# Patient Record
Sex: FEMALE | Race: WHITE | Employment: FULL TIME | URBAN - NONMETROPOLITAN AREA
[De-identification: names, ages, dates, MRNs, and addresses within clinical notes are randomized per-mention and may not be internally consistent; named-entity substitution may affect disease eponyms.]

---

## 2020-05-03 ENCOUNTER — HOSPITAL ENCOUNTER (EMERGENCY)
Age: 40
Discharge: HOME | End: 2020-05-03
Attending: EMERGENCY MEDICINE
Payer: COMMERCIAL

## 2020-05-03 VITALS
DIASTOLIC BLOOD PRESSURE: 88 MMHG | HEART RATE: 89 BPM | OXYGEN SATURATION: 98 % | RESPIRATION RATE: 18 BRPM | SYSTOLIC BLOOD PRESSURE: 138 MMHG | TEMPERATURE: 97.2 F

## 2020-05-03 LAB
GROUP A STREP CULTURE, REFLEX: NEGATIVE
REFLEX THROAT C + S: NORMAL

## 2020-05-03 PROCEDURE — 99282 EMERGENCY DEPT VISIT SF MDM: CPT

## 2020-05-03 PROCEDURE — 87070 CULTURE OTHR SPECIMN AEROBIC: CPT

## 2020-05-03 PROCEDURE — 87880 STREP A ASSAY W/OPTIC: CPT

## 2020-05-03 RX ORDER — FERROUS SULFATE 325(65) MG
325 TABLET ORAL
COMMUNITY
Start: 2018-08-13

## 2020-05-03 RX ORDER — AZITHROMYCIN 250 MG/1
TABLET, FILM COATED ORAL
Qty: 1 PACKET | Refills: 0 | Status: SHIPPED | OUTPATIENT
Start: 2020-05-03 | End: 2020-05-07

## 2020-05-03 ASSESSMENT — ENCOUNTER SYMPTOMS
ABDOMINAL PAIN: 0
SORE THROAT: 1
COUGH: 0
SHORTNESS OF BREATH: 0
WHEEZING: 0
BACK PAIN: 0
NAUSEA: 0

## 2020-05-03 ASSESSMENT — PAIN DESCRIPTION - LOCATION: LOCATION: THROAT

## 2020-05-03 NOTE — ED NOTES
Discharge instructions reviewed with patient and questions answered. Skin warm and dry, color normal for ethnicity. Remains alert and cooperative. Denies further questions or concerns at this time.      Rose Ruelas RN  05/03/20 5298

## 2020-05-03 NOTE — ED PROVIDER NOTES
[facility]  eMERGENCY dEPARTMENT eNCOUnter             [address]    CHIEF COMPLAINT    Chief Complaint   Patient presents with    Pharyngitis       Nurses Notes reviewed and I agree except as noted in the HPI. HPI    Alexandro Lane is a 44 y.o. female who presents with a 3 day history of worsening sore  Throat and frontal headache, mild rash on her cheeks which itches a little. No fever, nausea, abdominal pain. She has had Strep in the past. 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 Current pain is 3/10, burning, soreness in the throat. No treatment tried. REVIEW OF SYSTEMS      Review of Systems   Constitutional: Positive for malaise/fatigue. Negative for diaphoresis and fever. HENT: Positive for sore throat. Negative for congestion and ear pain. Respiratory: Negative for cough, shortness of breath and wheezing. Cardiovascular: Negative for chest pain and palpitations. Gastrointestinal: Negative for abdominal pain and nausea. Musculoskeletal: Negative for back pain, myalgias and neck pain. Skin: Positive for itching (cheeks) and rash. Neurological: Positive for headaches. Negative for dizziness and focal weakness. All other systems reviewed and are negative. PAST MEDICAL HISTORY     has no past medical history on file. SURGICAL HISTORY     has no past surgical history on file. CURRENT MEDICATIONS    Discharge Medication List as of 5/3/2020  3:58 PM      CONTINUE these medications which have NOT CHANGED    Details   ferrous sulfate (IRON 325) 325 (65 Fe) MG tablet Take 325 mg by mouth 2 times daily (with meals)Historical Med             ALLERGIES    is allergic to bupropion. FAMILY HISTORY    has no family status information on file. family history is not on file. SOCIAL HISTORY     reports that she has never smoked.  She has never used smokeless DISPOSITION/PLAN    DISPOSITION Decision To Discharge 05/03/2020 03:56:57 PM      PATIENT REFERRED TO:    Trey Wei  [address] [place] [address]  [phone]      As needed      DISCHARGE MEDICATIONS:    Discharge Medication List as of 5/3/2020  3:58 PM      START taking these medications    Details   azithromycin (ZITHROMAX Z-JH) 250 MG tablet Take 2 tablets (500 mg) on Day 1, and then take 1 tablet (250 mg) on days 2 through 5., Disp-1 packet, R-0Print                (Please note that portions of this note were completed with a voice recognition program.  Efforts were made to edit the dictations but occasionally words are mis-transcribed.)      Kaleb Soto MD  05/03/20 [id number]

## 2020-05-04 ENCOUNTER — CARE COORDINATION (OUTPATIENT)
Dept: CARE COORDINATION | Age: 40
End: 2020-05-04

## 2020-05-05 LAB — THROAT/NOSE CULTURE: NORMAL

## 2020-09-03 ENCOUNTER — HOSPITAL ENCOUNTER (EMERGENCY)
Age: 40
Discharge: HOME | End: 2020-09-03
Payer: COMMERCIAL

## 2020-09-03 VITALS
TEMPERATURE: 97.2 F | HEIGHT: 59 IN | WEIGHT: 143 LBS | HEART RATE: 107 BPM | DIASTOLIC BLOOD PRESSURE: 91 MMHG | SYSTOLIC BLOOD PRESSURE: 147 MMHG | RESPIRATION RATE: 16 BRPM | OXYGEN SATURATION: 100 % | BODY MASS INDEX: 28.83 KG/M2

## 2020-09-03 LAB
GROUP A STREP CULTURE, REFLEX: NEGATIVE
REFLEX THROAT C + S: NORMAL

## 2020-09-03 PROCEDURE — U0003 INFECTIOUS AGENT DETECTION BY NUCLEIC ACID (DNA OR RNA); SEVERE ACUTE RESPIRATORY SYNDROME CORONAVIRUS 2 (SARS-COV-2) (CORONAVIRUS DISEASE [COVID-19]), AMPLIFIED PROBE TECHNIQUE, MAKING USE OF HIGH THROUGHPUT TECHNOLOGIES AS DESCRIBED BY CMS-2020-01-R: HCPCS

## 2020-09-03 PROCEDURE — 87880 STREP A ASSAY W/OPTIC: CPT

## 2020-09-03 PROCEDURE — 87070 CULTURE OTHR SPECIMN AEROBIC: CPT

## 2020-09-03 PROCEDURE — 99203 OFFICE O/P NEW LOW 30 MIN: CPT | Performed by: NURSE PRACTITIONER

## 2020-09-03 PROCEDURE — 99214 OFFICE O/P EST MOD 30 MIN: CPT

## 2020-09-03 RX ORDER — CEFDINIR 300 MG/1
300 CAPSULE ORAL 2 TIMES DAILY
Qty: 14 CAPSULE | Refills: 0 | Status: SHIPPED | OUTPATIENT
Start: 2020-09-03 | End: 2020-09-10

## 2020-09-03 SDOH — HEALTH STABILITY: MENTAL HEALTH: HOW OFTEN DO YOU HAVE A DRINK CONTAINING ALCOHOL?: NEVER

## 2020-09-03 ASSESSMENT — ENCOUNTER SYMPTOMS
VOMITING: 0
SHORTNESS OF BREATH: 0
NAUSEA: 0
SINUS PRESSURE: 1
SORE THROAT: 1
COUGH: 0

## 2020-09-03 ASSESSMENT — PAIN DESCRIPTION - PROGRESSION: CLINICAL_PROGRESSION: NOT CHANGED

## 2020-09-03 ASSESSMENT — PAIN DESCRIPTION - PAIN TYPE: TYPE: ACUTE PAIN

## 2020-09-03 ASSESSMENT — PAIN - FUNCTIONAL ASSESSMENT: PAIN_FUNCTIONAL_ASSESSMENT: ACTIVITIES ARE NOT PREVENTED

## 2020-09-03 ASSESSMENT — PAIN DESCRIPTION - FREQUENCY: FREQUENCY: CONTINUOUS

## 2020-09-03 ASSESSMENT — PAIN DESCRIPTION - LOCATION: LOCATION: THROAT

## 2020-09-03 ASSESSMENT — PAIN DESCRIPTION - ONSET: ONSET: GRADUAL

## 2020-09-03 ASSESSMENT — PAIN SCALES - GENERAL: PAINLEVEL_OUTOF10: 3

## 2020-09-03 ASSESSMENT — PAIN DESCRIPTION - DESCRIPTORS: DESCRIPTORS: DULL;ACHING

## 2020-09-03 NOTE — ED NOTES
Oropharyngeal covid swab obtained without difficulty. Pt tolerated well.       Rainer Butler RN  09/03/20 [id number]

## 2020-09-03 NOTE — ED PROVIDER NOTES
[address]  Urgent Care Encounter       CHIEF COMPLAINT       Chief Complaint   Patient presents with    Nasal Congestion    Pharyngitis    Fever     had a fever of 101 last evening that has since broke       Nurses Notes reviewed and I agree except as noted in the HPI. HISTORY OF PRESENT ILLNESS   Jerica Chairez is a 36 y.o. female who presents for evaluation of sore throat, runny nose, congestion, sinus pressure and fever. Patient states that the congestion, runny nose and sore throat began roughly 4 to 5 days ago with fever beginning suddenly last night. States that her temperature was as high as 101. She states that she has been taking Claritin as she has seasonal allergies as well as Sudafed. She does report Tylenol yesterday. She denies any fevers today. 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 The history is provided by the patient. REVIEW OF SYSTEMS     Review of Systems   Constitutional: Positive for chills and fever. HENT: Positive for congestion, sinus pressure and sore throat. Respiratory: Negative for cough and shortness of breath. Cardiovascular: Negative for chest pain. Gastrointestinal: Negative for nausea and vomiting. Musculoskeletal: Negative for arthralgias and myalgias. Skin: Negative for rash. Allergic/Immunologic: Positive for environmental allergies. Neurological: Negative for headaches. PAST MEDICAL HISTORY   History reviewed. No pertinent past medical history. SURGICALHISTORY     Patient  has a past surgical history that includes  section and Uvulectomy. CURRENT MEDICATIONS       Previous Medications    FERROUS SULFATE (IRON 325) 325 (65 FE) MG TABLET    Take 325 mg by mouth 2 times daily (with meals)       ALLERGIES     Patient is is allergic to bupropion. Patients   There is no immunization history on file for this patient.     FAMILY HISTORY     Patient's family normal.         DIAGNOSTIC RESULTS     Labs:  Results for orders placed or performed during the hospital encounter of 09/03/20   Strep A culture, throat   Result Value Ref Range    REFLEX THROAT C + S INDICATED    STREP A ANTIGEN   Result Value Ref Range    GROUP A STREP CULTURE, REFLEX Negative        IMAGING:    No orders to display         EKG: none    URGENT CARE COURSE:     Vitals:    09/03/20 0906   BP: (!) 147/91   Pulse: 107   Resp: 16   Temp: 97.2 °F (36.2 °C)   TempSrc: Temporal   SpO2: 100%   Weight: 143 lb (64.9 kg)   Height: 4' 11\" (1.499 m)       Medications - No data to display         PROCEDURES:  None    FINAL IMPRESSION      1. Acute frontal sinusitis, recurrence not specified          DISPOSITION/ PLAN       Strep swab is negative at this time I discussed with the patient that based on physical exam I believe she likely has sinus type congestion versus infection as causing drainage down the back of her throat. Discussed with the patient that based on her job and recorded fever, I believe a coronavirus test would be warranted at this time and she is agreeable to plan as discussed. She advised to quarantine at home until notified of her results and to continue Claritin and Sudafed. She is also instructed to hydrate, rest, and take Tylenol as needed. She is agreeable to plan as discussed.     PATIENT REFERRED TO:  Sujey Me, DO  [address]  [id number]      DISCHARGE MEDICATIONS:  New Prescriptions    CEFDINIR (OMNICEF) 300 MG CAPSULE    Take 1 capsule by mouth 2 times daily for 7 days       Discontinued Medications    No medications on file       Current Discharge Medication List          FLORA Mendoza CNP    (Please note that portions of this note were completed with a voice recognition program. Efforts were made to edit the dictations but occasionally words are mis-transcribed.)          FLORA Mendoza CNP  09/03/20 [address],

## 2020-09-04 ENCOUNTER — CARE COORDINATION (OUTPATIENT)
Dept: CARE COORDINATION | Age: 40
End: 2020-09-04

## 2020-09-04 NOTE — CARE COORDINATION
Patient contacted regarding recent visit for viral symptoms. This William Courtney contacted the patient by telephone to perform post discharge call. Verified name and  with patient as identifiers. Provided introduction to self, and reason for call due to viral symptoms of infection and/or exposure to COVID-19. Patient presented to emergency department/flu clinic with complaints of viral symptoms/exposure to COVID. Patient reports symptoms are the same. Due to no new or worsening symptoms the RN CTN/ACM was not notified for escalation. Discussed exposure protocols and quarantine with CDC Guidelines What To Do If You Are Sick    Patient was given an opportunity for questions and concerns. Stay home except to get medical care    Separate yourself from other people and animals in your home    Call ahead before visiting your doctor    Wear a facemask    Cover your coughs and sneezes    Clean your hands often    Avoid sharing personal household items    Clean all high-touch surfaces everyday    Monitor your symptoms  Seek prompt medical attention if your illness is worsening (e.g., difficulty breathing). Before seeking care, call your healthcare provider and tell them that you have, or are being evaluated for, COVID-19. Put on a facemask before you enter the facility. These steps will help the healthcare provider's office to keep other people in the office or waiting room from getting infected or exposed. Ask your healthcare provider to call the local or Pending sale to [facility] health department. Persons who are placed under If you have a medical emergency and need to call 911, notify the dispatch personnel that you have, or are being evaluated for COVID-19. If possible, put on a facemask before emergency medical services arrive. The patient agrees to contact the [facility] exposure line [phone], local health department [place] Department of Health: ([phone]) and PCP office for questions related to their healthcare.  Author provided contact information for future reference. Patient/family/caregiver given information for [employer] and agrees to enroll yes  Patient's preferred e-mail: [email or web address]  Patient's preferred phone number: [phone]  Based on Loop alert triggers, patient will be contacted by nurse care manager for worsening symptoms. Spoke with pt on phone. Pt reports she is feeling \"a little better. \" Pt had Rx filled and is taking as prescribed. Educated pt on covid precautions and pt is compliant. Introduced LOOP, pt agreed and provided email address. Will resolve at this time.

## 2020-09-05 LAB — THROAT/NOSE CULTURE: NORMAL

## 2020-09-06 LAB — SARS-COV-2: NOT DETECTED

## 2021-02-10 ENCOUNTER — OFFICE VISIT (OUTPATIENT)
Dept: CARDIOLOGY CLINIC | Age: 41
End: 2021-02-10
Payer: COMMERCIAL

## 2021-02-10 VITALS
DIASTOLIC BLOOD PRESSURE: 78 MMHG | WEIGHT: 145 LBS | SYSTOLIC BLOOD PRESSURE: 136 MMHG | BODY MASS INDEX: 29.23 KG/M2 | HEIGHT: 59 IN | HEART RATE: 97 BPM

## 2021-02-10 DIAGNOSIS — R07.89 OTHER CHEST PAIN: Primary | ICD-10-CM

## 2021-02-10 PROCEDURE — 93000 ELECTROCARDIOGRAM COMPLETE: CPT | Performed by: NUCLEAR MEDICINE

## 2021-02-10 PROCEDURE — 99204 OFFICE O/P NEW MOD 45 MIN: CPT | Performed by: NUCLEAR MEDICINE

## 2021-02-10 RX ORDER — PANTOPRAZOLE SODIUM 40 MG/1
40 TABLET, DELAYED RELEASE ORAL DAILY
COMMUNITY
End: 2021-02-10 | Stop reason: SDUPTHER

## 2021-02-10 RX ORDER — INDOMETHACIN 25 MG/1
25 CAPSULE ORAL 2 TIMES DAILY WITH MEALS
Qty: 14 CAPSULE | Refills: 0 | Status: SHIPPED | OUTPATIENT
Start: 2021-02-10 | End: 2021-04-20

## 2021-02-10 RX ORDER — PANTOPRAZOLE SODIUM 40 MG/1
40 TABLET, DELAYED RELEASE ORAL DAILY
Qty: 14 TABLET | Refills: 0 | Status: SHIPPED | OUTPATIENT
Start: 2021-02-10 | End: 2021-04-20

## 2021-02-10 RX ORDER — INDOMETHACIN 25 MG/1
25 CAPSULE ORAL 2 TIMES DAILY WITH MEALS
COMMUNITY
End: 2021-02-10 | Stop reason: SDUPTHER

## 2021-02-10 ASSESSMENT — ENCOUNTER SYMPTOMS
RECTAL PAIN: 0
ABDOMINAL PAIN: 0
BACK PAIN: 0
BLOOD IN STOOL: 0
CHEST TIGHTNESS: 1
DIARRHEA: 0
NAUSEA: 0
VOMITING: 0
SHORTNESS OF BREATH: 1
CONSTIPATION: 0
ANAL BLEEDING: 0
FACIAL SWELLING: 0
COLOR CHANGE: 0
ABDOMINAL DISTENTION: 0

## 2021-02-10 NOTE — PROGRESS NOTES
[address] [facility] [place] [address]  [facility] [place] [address]  Dept: [phone]  Dept Fax: [phone]  Loc: [phone]    Visit Date: 2/10/2021    Argentina Mcclure is a 36 y.o. female who presents todayfor:  Chief Complaint   Patient presents with    New Patient    Established New Doctor    Chest Pain    Headache     Here for the first time  Used to live in [place]   Lately had some chest pain  More sharp in nature  Several month now  Few times a month   No triggers  Resting   Some times dull ache  Every few days   Not exertional   Associated headache  No radiation   BP is stable  No known HTN   Healthy other wise  No smoking   Family history of CAD  Father side     HPI:  HPI  History reviewed. No pertinent past medical history. Past Surgical History:   Procedure Laterality Date     SECTION      UVULECTOMY       Family History   Problem Relation Age of Onset    Heart Disease Father      Social History     Tobacco Use    Smoking status: Never Smoker    Smokeless tobacco: Never Used   Substance Use Topics    Alcohol use: Never     Frequency: Never      Current Outpatient Medications   Medication Sig Dispense Refill    ferrous sulfate (IRON 325) 325 (65 Fe) MG tablet Take 325 mg by mouth 2 times daily (with meals)       No current facility-administered medications for this visit.       Allergies   Allergen Reactions    Bupropion Hives     Health Maintenance   Topic Date Due    Hepatitis C screen  1980    Varicella vaccine (1 of 2 - 2-dose childhood series) 1981    HIV screen  1995    DTaP/Tdap/Td vaccine (1 - Tdap) 1999    Cervical cancer screen  2001    Lipid screen  2020    Diabetes screen  2020    Flu vaccine (1) 2020    Hepatitis A vaccine  Aged Out    Hepatitis B vaccine  Aged Out    Hib vaccine  Aged Out    Meningococcal (ACWY) vaccine  Aged Out  Pneumococcal 0-64 years Vaccine  Aged Out       Subjective:  Review of Systems   Constitutional: Positive for fatigue. HENT: Negative for facial swelling and postnasal drip. Respiratory: Positive for chest tightness and shortness of breath. Cardiovascular: Positive for chest pain. Negative for palpitations. Gastrointestinal: Negative for abdominal distention, abdominal pain, anal bleeding, blood in stool, constipation, diarrhea, nausea, rectal pain and vomiting. Genitourinary: Negative for dyspareunia, enuresis and urgency. Musculoskeletal: Negative for arthralgias, back pain, gait problem, joint swelling, myalgias, neck pain and neck stiffness. Skin: Negative for color change, pallor, rash and wound. Neurological: Negative for dizziness and light-headedness. Psychiatric/Behavioral: Negative for confusion, decreased concentration, dysphoric mood, hallucinations and self-injury. The patient is not nervous/anxious and is not hyperactive. Objective:  Physical Exam  HENT:      Head: Normocephalic. Nose: Nose normal.      Mouth/Throat:      Mouth: Mucous membranes are moist.   Eyes:      Pupils: Pupils are equal, round, and reactive to light. Neck:      Musculoskeletal: Normal range of motion. Cardiovascular:      Rate and Rhythm: Normal rate and regular rhythm. Heart sounds: Murmur present. Pulmonary:      Effort: No respiratory distress. Breath sounds: No stridor. No wheezing, rhonchi or rales. Chest:      Chest wall: No tenderness. Abdominal:      General: There is no distension. Palpations: There is no mass. Tenderness: There is no abdominal tenderness. There is no right CVA tenderness, left CVA tenderness, guarding or rebound. Hernia: No hernia is present. Musculoskeletal:         General: No swelling, tenderness, deformity or signs of injury. Right lower leg: No edema. Left lower leg: No edema.    Skin:

## 2021-04-20 PROBLEM — R06.83 SNORING: Status: ACTIVE | Noted: 2017-03-22

## 2021-04-20 PROBLEM — J30.89 CHRONIC NONSEASONAL ALLERGIC RHINITIS DUE TO POLLEN: Status: ACTIVE | Noted: 2017-03-22

## 2021-04-20 PROBLEM — N92.6 MENSTRUAL DISORDER: Status: ACTIVE | Noted: 2017-03-22

## 2021-04-20 PROBLEM — D50.8 IRON DEFICIENCY ANEMIA SECONDARY TO INADEQUATE DIETARY IRON INTAKE: Status: ACTIVE | Noted: 2017-03-22

## 2021-04-20 PROBLEM — G44.219 EPISODIC TENSION-TYPE HEADACHE, NOT INTRACTABLE: Status: ACTIVE | Noted: 2017-07-12

## 2021-04-20 PROBLEM — J34.3 NASAL TURBINATE HYPERTROPHY: Status: ACTIVE | Noted: 2017-11-22

## 2021-04-20 PROBLEM — F41.9 ANXIETY: Status: ACTIVE | Noted: 2017-03-22

## 2021-04-26 ENCOUNTER — OFFICE VISIT (OUTPATIENT)
Dept: CARDIOLOGY CLINIC | Age: 41
End: 2021-04-26
Payer: COMMERCIAL

## 2021-04-26 VITALS
HEART RATE: 100 BPM | HEIGHT: 59 IN | DIASTOLIC BLOOD PRESSURE: 64 MMHG | WEIGHT: 147 LBS | BODY MASS INDEX: 29.64 KG/M2 | SYSTOLIC BLOOD PRESSURE: 132 MMHG

## 2021-04-26 DIAGNOSIS — R07.9 CHEST PAIN, UNSPECIFIED TYPE: Primary | ICD-10-CM

## 2021-04-26 PROCEDURE — 99214 OFFICE O/P EST MOD 30 MIN: CPT | Performed by: INTERNAL MEDICINE

## 2021-04-26 RX ORDER — NITROGLYCERIN 0.4 MG/1
0.4 TABLET SUBLINGUAL EVERY 5 MIN PRN
Qty: 25 TABLET | Refills: 3 | Status: SHIPPED | OUTPATIENT
Start: 2021-04-26

## 2021-04-26 NOTE — ADDENDUM NOTE
Addended by: [facility] Services[facility] Ferdinand on: 4/26/2021 07:57 AM     Modules accepted: Orders

## 2021-04-26 NOTE — PROGRESS NOTES
 UVULECTOMY         Review of Systems   Constitutional: Negative for chills and fever  HENT: Negative for congestion, sinus pressure, sneezing and sore throat. Eyes: Negative for pain, discharge, redness and itching. Respiratory: Negative for apnea, cough  Gastrointestinal: Negative for blood in stool, constipation, diarrhea   Endocrine: Negative for cold intolerance, heat intolerance, polydipsia. Genitourinary: Negative for dysuria, enuresis, flank pain and hematuria. Musculoskeletal: Negative for arthralgias, joint swelling and neck pain. Neurological: Negative for numbness and headaches. Psychiatric/Behavioral: Negative for agitation, confusion, decreased concentration and dysphoric mood. Objective:     /64   Pulse 100   Ht 4' 11\" (1.499 m)   Wt 147 lb (66.7 kg)   LMP 04/13/2021   BMI 29.69 kg/m²     Wt Readings from Last 3 Encounters:   04/26/21 147 lb (66.7 kg)   04/20/21 145 lb (65.8 kg)   02/10/21 145 lb (65.8 kg)     BP Readings from Last 3 Encounters:   04/26/21 132/64   04/20/21 124/60   02/10/21 136/78       Nursing note and vitals reviewed. Physical Exam   Constitutional: Oriented to person, place, and time. Appears well-developed and well-nourished. HENT:   Head: Normocephalic and atraumatic. Eyes: EOM are normal. Pupils are equal, round, and reactive to light. Neck: Normal range of motion. Neck supple. No JVD present. Cardiovascular: Normal rate, regular rhythm, normal heart sounds and intact distal pulses. No murmur heard. Pulmonary/Chest: Effort normal and breath sounds normal. No respiratory distress. No wheezes. No rales. Abdominal: Soft. Bowel sounds are normal. No distension. There is no tenderness. Musculoskeletal: Normal range of motion. No edema. Neurological: Alert and oriented to person, place, and time. No cranial nerve deficit. Coordination normal.   Skin: Skin is warm and dry. Psychiatric: Normal mood and affect.        No results found for: CKTOTAL, CKMB, CKMBINDEX    No results found for: WBC, RBC, HGB, HCT, MCV, MCH, MCHC, RDW, PLT, MPV    No results found for: NA, K, CL, CO2, BUN, LABALBU, CREATININE, CALCIUM, GFRAA, LABGLOM, GLUCOSE    No results found for: ALKPHOS, ALT, AST, PROT, BILITOT, BILIDIR, IBILI, LABALBU    No results found for: MG    No results found for: INR, PROTIME      No results found for: LABA1C    No results found for: TRIG, HDL, LDLCALC, LDLDIRECT, LABVLDL    No results found for: TSH      Testing Reviewed:      I have individually reviewed the cardiac test below:    ECHO: No results found for this or any previous visit. Assessment/Plan   Atypical chest pain - progressive  Check Cardiolite as pain has become worse. Rx NTG SL. Avoid heavy exertion. BP and HR well controlled. No murmurs. May need CTA coronaries if still no answer. If all negative, consider EGD. Discussed diet/exercise/BP/weight loss/health lifestyle choices/lipids; the patient understands the goals and will try to comply.     Disposition:  3-6 months         Electronically signed by Anahi Greenberg MD   4/26/2021 at 7:45 AM EDT

## 2021-04-26 NOTE — PROGRESS NOTES
New To Provider. Patient has been having some intermittent chest pain on left side. She has intermittent shortness of breath with exertion. She has been having intermittent dizziness but feels it might be due to her new medication.

## 2021-04-27 ENCOUNTER — TELEPHONE (OUTPATIENT)
Dept: CARDIOLOGY CLINIC | Age: 41
End: 2021-04-27

## 2021-05-12 ENCOUNTER — HOSPITAL ENCOUNTER (OUTPATIENT)
Dept: NON INVASIVE DIAGNOSTICS | Age: 41
Discharge: HOME | End: 2021-05-12
Payer: COMMERCIAL

## 2021-05-12 VITALS — HEIGHT: 59 IN | WEIGHT: 140 LBS | BODY MASS INDEX: 28.22 KG/M2

## 2021-05-12 DIAGNOSIS — R07.9 CHEST PAIN, UNSPECIFIED TYPE: ICD-10-CM

## 2021-05-12 LAB
LV EF: 74 %
LVEF MODALITY: NORMAL

## 2021-05-12 PROCEDURE — 3430000000 HC RX DIAGNOSTIC RADIOPHARMACEUTICAL: Performed by: INTERNAL MEDICINE

## 2021-05-12 PROCEDURE — 6360000002 HC RX W HCPCS

## 2021-05-12 PROCEDURE — 93017 CV STRESS TEST TRACING ONLY: CPT | Performed by: INTERNAL MEDICINE

## 2021-05-12 PROCEDURE — A9500 TC99M SESTAMIBI: HCPCS | Performed by: INTERNAL MEDICINE

## 2021-05-12 PROCEDURE — 78452 HT MUSCLE IMAGE SPECT MULT: CPT

## 2021-05-12 RX ADMIN — Medication 8.6 MILLICURIE: at 12:57

## 2021-05-12 RX ADMIN — Medication 32.9 MILLICURIE: at 14:03

## 2021-10-11 ENCOUNTER — OFFICE VISIT (OUTPATIENT)
Dept: CARDIOLOGY CLINIC | Age: 41
End: 2021-10-11
Payer: COMMERCIAL

## 2021-10-11 VITALS
SYSTOLIC BLOOD PRESSURE: 140 MMHG | DIASTOLIC BLOOD PRESSURE: 80 MMHG | HEART RATE: 100 BPM | WEIGHT: 152.6 LBS | HEIGHT: 59 IN | BODY MASS INDEX: 30.76 KG/M2

## 2021-10-11 DIAGNOSIS — R07.9 CHEST PAIN, UNSPECIFIED TYPE: Primary | ICD-10-CM

## 2021-10-11 DIAGNOSIS — I10 ESSENTIAL HYPERTENSION: ICD-10-CM

## 2021-10-11 PROCEDURE — 99213 OFFICE O/P EST LOW 20 MIN: CPT | Performed by: INTERNAL MEDICINE

## 2021-10-11 RX ORDER — METOPROLOL SUCCINATE 25 MG/1
25 TABLET, EXTENDED RELEASE ORAL DAILY
Qty: 30 TABLET | Refills: 3 | Status: SHIPPED | OUTPATIENT
Start: 2021-10-11 | End: 2021-12-10 | Stop reason: SDUPTHER

## 2021-10-11 RX ORDER — ASPIRIN 81 MG/1
81 TABLET ORAL DAILY
COMMUNITY

## 2021-10-11 RX ORDER — HYDROXYZINE HYDROCHLORIDE 25 MG/1
TABLET, FILM COATED ORAL
COMMUNITY
Start: 2021-08-24 | End: 2021-12-27

## 2021-10-11 RX ORDER — ISOSORBIDE MONONITRATE 30 MG/1
30 TABLET, EXTENDED RELEASE ORAL DAILY
Qty: 60 TABLET | Refills: 3 | Status: SHIPPED | OUTPATIENT
Start: 2021-10-11 | End: 2021-10-20 | Stop reason: CLARIF

## 2021-10-11 NOTE — PROGRESS NOTES
[facility] [address]  [place] [address]  Dept: [phone]  Dept Fax: [phone]  Loc: [phone]    Visit Date: 10/11/2021    Ms. Gennie Dandy is a 39 y.o. female  who presented for:  Chief Complaint   Patient presents with    6 Month Follow-Up       HPI:   HPI   39 yo F f/u for chest pain. This past week she noted more pain. Right breast or superior to left breast.  Preserved Ef. Last stress shows DTS = +9, no ischemia. /100. She did not use NTG SL prn--she is afraid of headache. She states the pain comes and goes, worst severity 4/10. Feels like a constant \"flicking in her chest.  No other radiation, not always associated with exertion. She notes sometimes her HR is fast on her phone. Current Outpatient Medications:     hydrOXYzine (ATARAX) 25 MG tablet, TAKE 1 TABLET BY MOUTH EVERY NIGHT AS NEEDED FOR ANXIETY, Disp: , Rfl:     PREVIDENT 5000 PLUS 1.1 % CREA, , Disp: , Rfl:     sertraline (ZOLOFT) 50 MG tablet, Take 1 tablet by mouth daily, Disp: 90 tablet, Rfl: 3    nitroGLYCERIN (NITROSTAT) 0.4 MG SL tablet, Place 1 tablet under the tongue every 5 minutes as needed for Chest pain, Disp: 25 tablet, Rfl: 3    ferrous sulfate (IRON 325) 325 (65 Fe) MG tablet, Take 325 mg by mouth 3 times daily (with meals) , Disp: , Rfl:     Past Medical History  Zayra  has a past medical history of Anxiety and Headache. Social History  Zayra  reports that she has never smoked. She has never used smokeless tobacco. She reports current alcohol use. She reports that she does not use drugs. Family History  Zayra family history includes COPD in her mother; Cancer in her father and mother; Heart Disease in her father. There is no family history of bicuspid aortic valve, aneurysms, heart transplant, pacemakers, defibrillators, or sudden cardiac death.       Past Surgical History   Past Surgical History:   Procedure dry.   Psychiatric: Normal mood and affect. No results found for: CKTOTAL, CKMB, CKMBINDEX    Lab Results   Component Value Date    WBC 5.6 08/10/2021    RBC 4.45 08/10/2021    HGB 13.2 08/10/2021    HCT 38.7 08/10/2021    MCV 87 08/10/2021    MCH 29.7 08/10/2021    MCHC 34.1 08/10/2021    RDW 22.3 08/10/2021     08/10/2021    MPV 9.0 08/10/2021       Lab Results   Component Value Date     05/25/2021    K 4.1 05/25/2021     05/25/2021    CO2 28 05/25/2021    BUN 12 05/25/2021    LABALBU 4.3 05/25/2021    CREATININE 1.00 05/25/2021    CALCIUM 8.8 05/25/2021    GLUCOSE 89 05/25/2021       Lab Results   Component Value Date    ALKPHOS 55 05/25/2021    ALT 10 05/25/2021    AST 17 05/25/2021    PROT 6.9 05/25/2021    BILITOT 0.3 05/25/2021    BILIDIR 0.1 05/25/2021    LABALBU 4.3 05/25/2021       No results found for: MG    No results found for: INR, PROTIME      No results found for: LABA1C    Lab Results   Component Value Date    TRIG 86 05/25/2021    HDL 71 05/25/2021    LDLCALC 140 05/25/2021    LABVLDL 17 05/25/2021       No results found for: TSH      Testing Reviewed:      I have individually reviewed the cardiac test below:    ECHO: No results found for this or any previous visit. Assessment/Plan   Atypical chest pain  HTN - uncontrolled  DTS = +9  Add ASA, Imdur, BB. Monitor for symptoms. Reinforced use of NTG SL prn. If not improved or pain continues on and off, then would proceed with cath. The patient was advised on risk/benefits of the new Rx and she agreed to proceed with the medication(s). Discussed symptoms needing emergency care. Discussed diet/exercise/BP/weight loss/health lifestyle choices/lipids; the patient understands the goals and will try to comply.     Disposition:  3-6 months         Electronically signed by Carroll Manzo MD   10/11/2021 at 12:24 PM EDT

## 2021-10-20 ENCOUNTER — TELEPHONE (OUTPATIENT)
Dept: CARDIOLOGY CLINIC | Age: 41
End: 2021-10-20

## 2021-10-20 NOTE — TELEPHONE ENCOUNTER
Patient called stating headaches are pretty bad since starting Imdur. Verbal Order from Dr. Yan Ureña and advise patient to call our office back if having chest pain. LM for patient to call our office back. .    Please agree Dr. Rocky Narayan.

## 2021-10-21 NOTE — TELEPHONE ENCOUNTER
Patient verbalized understanding. Dr. Aris Guevara please agree to verbal order. [address] [facility] updated.

## 2021-12-10 RX ORDER — METOPROLOL SUCCINATE 25 MG/1
25 TABLET, EXTENDED RELEASE ORAL DAILY
Qty: 90 TABLET | Refills: 3 | Status: SHIPPED | OUTPATIENT
Start: 2021-12-10

## 2022-01-24 ENCOUNTER — OFFICE VISIT (OUTPATIENT)
Dept: CARDIOLOGY CLINIC | Age: 42
End: 2022-01-24
Payer: COMMERCIAL

## 2022-01-24 ENCOUNTER — TELEPHONE (OUTPATIENT)
Dept: CARDIOLOGY CLINIC | Age: 42
End: 2022-01-24

## 2022-01-24 VITALS
DIASTOLIC BLOOD PRESSURE: 80 MMHG | HEART RATE: 91 BPM | HEIGHT: 59 IN | BODY MASS INDEX: 32.21 KG/M2 | WEIGHT: 159.8 LBS | SYSTOLIC BLOOD PRESSURE: 134 MMHG

## 2022-01-24 DIAGNOSIS — F41.9 ANXIETY: ICD-10-CM

## 2022-01-24 DIAGNOSIS — K21.9 GASTROESOPHAGEAL REFLUX DISEASE WITHOUT ESOPHAGITIS: ICD-10-CM

## 2022-01-24 DIAGNOSIS — R07.2 PRECORDIAL PAIN: ICD-10-CM

## 2022-01-24 DIAGNOSIS — R07.89 ATYPICAL CHEST PAIN: Primary | ICD-10-CM

## 2022-01-24 PROCEDURE — 99214 OFFICE O/P EST MOD 30 MIN: CPT | Performed by: INTERNAL MEDICINE

## 2022-01-24 NOTE — PROGRESS NOTES
3 month follow-up. Patient states she has chest tightness not necessarily associated with exertion. She denies having any shortness of breath or dizziness. She has had episodes where her heart rate is low then it goes high. She also states she has been having headaches.

## 2022-01-24 NOTE — PROGRESS NOTES
[address] [facility] [address]  [facility] [address]  Dept: [phone]  Dept Fax: [phone]  Loc: [phone]    Visit Date: 1/24/2022    Ms. Jason Asif is a 39 y.o. female  who presented for:  Chief Complaint   Patient presents with    3 Month Follow-Up    Chest Pain       HPI:   Zayra. Jason Asif is a 54-year-old female with PMHx of HTN and  chest pain. She is here for follow-up regarding her worsening daily intermittent chest pain. She had originally described this pain as dull and radiating across the precordium rated 2/10 with occasional radiation into the right shoulder. She noted the pain is worse while at work rated 4-5/10. She was seen in the past by a prior cardiologist for similar complaints to which she was prescribed a PPI which she no longer takes. There was also concern for anxiety induced chest pain for which she has been taking Zoloft. Stress test on 5/12/21 demonstrated normal perfusion at rest and with stress. Zurita Treadmill Score was +9 (low-risk). She was started on Toprol XL, ASA, and SL Nitro at the last visit. She has been taking these medications as prescribed without missed dosages. She has only taken 1 dose SL nitro with immediate relief of chest pain. Today she stated that she is still having chest discomfort located in the mid-chest with radiation to her left and right upper chest area intermittently. There are no associated exacerbating factors. She denies fever, chills, headache, lightheadedness, change in vision, rhinorrhea, congestion, sore throat, cough, hemoptysis, chest pain, palpitations, BROWN, PND, shortness of breath, abdominal pain, nausea, vomiting, hematemesis, change in bowel/bladder habits, hematochezia, hematuria, weakness, difficulty with ambulation, numbness/tingling, or known exposure to sick contacts.       Current Outpatient Medications:     hydrOXYzine (ATARAX) 25 MG tablet, TAKE 1 TABLET BY MOUTH EVERY NIGHT AS NEEDED FOR ANXIETY, Disp: 30 tablet, Rfl: 3    metoprolol succinate (TOPROL XL) 25 MG extended release tablet, Take 1 tablet by mouth daily, Disp: 90 tablet, Rfl: 3    aspirin 81 MG EC tablet, Take 81 mg by mouth daily, Disp: , Rfl:     PREVIDENT 5000 PLUS 1.1 % CREA, , Disp: , Rfl:     sertraline (ZOLOFT) 50 MG tablet, Take 1 tablet by mouth daily, Disp: 90 tablet, Rfl: 3    nitroGLYCERIN (NITROSTAT) 0.4 MG SL tablet, Place 1 tablet under the tongue every 5 minutes as needed for Chest pain, Disp: 25 tablet, Rfl: 3    ferrous sulfate (IRON 325) 325 (65 Fe) MG tablet, Take 325 mg by mouth 3 times daily (with meals) , Disp: , Rfl:     Past Medical History  Zayra  has a past medical history of Anxiety and Headache. Social History  Zayra  reports that she has never smoked. She has never used smokeless tobacco. She reports current alcohol use. She reports that she does not use drugs. Family History  Zayra family history includes COPD in her mother; Cancer in her father and mother; Heart Disease in her father. There is no family history of bicuspid aortic valve, aneurysms, heart transplant, pacemakers, defibrillators, or sudden cardiac death. Past Surgical History   Past Surgical History:   Procedure Laterality Date     SECTION      CHOLECYSTECTOMY      UVULECTOMY         Review of Systems   Constitutional: Negative for chills and fever, (+) increased weight gain  HENT: Negative for congestion, sinus pressure, sneezing and sore throat. Eyes: Negative for pain, discharge, redness and itching. Respiratory: Negative for apnea, cough  Gastrointestinal: Negative for blood in stool, constipation, diarrhea , (+) reflux  Endocrine: Negative for cold intolerance, heat intolerance, polydipsia. Genitourinary: Negative for dysuria, enuresis, flank pain and hematuria. Musculoskeletal: Negative for arthralgias, joint swelling and neck pain.    Neurological: Negative for numbness and headaches. Psychiatric/Behavioral: Negative for agitation, confusion, decreased concentration and dysphoric mood. (+) anxiety    Objective:     /80   Pulse 91   Ht 4' 11\" (1.499 m)   Wt 159 lb 12.8 oz (72.5 kg)   BMI 32.28 kg/m²     Wt Readings from Last 3 Encounters:   01/24/22 159 lb 12.8 oz (72.5 kg)   10/11/21 152 lb 9.6 oz (69.2 kg)   05/12/21 140 lb (63.5 kg)     BP Readings from Last 3 Encounters:   01/24/22 134/80   10/11/21 (!) 140/80   06/02/21 124/68       Nursing note and vitals reviewed. Physical Exam   Constitutional: Oriented to person, place, and time. Appears well-developed and well-nourished. HENT:   Head: Normocephalic and atraumatic. Eyes: EOM are normal. Pupils are equal, round, and reactive to light. Neck: Normal range of motion. Neck supple. No JVD present. Cardiovascular: Normal rate, regular rhythm, normal heart sounds and intact distal pulses. No murmur heard. Pulmonary/Chest: Effort normal and breath sounds normal. No respiratory distress. No wheezes. No rales. Abdominal: Soft. Bowel sounds are normal. No distension. There is no tenderness. Musculoskeletal: Normal range of motion. No edema. Neurological: Alert and oriented to person, place, and time. No cranial nerve deficit. Coordination normal.   Skin: Skin is warm and dry. Psychiatric: Normal mood and affect.        No results found for: CKTOTAL, CKMB, CKMBINDEX    Lab Results   Component Value Date    WBC 5.6 08/10/2021    RBC 4.45 08/10/2021    HGB 13.2 08/10/2021    HCT 38.7 08/10/2021    MCV 87 08/10/2021    MCH 29.7 08/10/2021    MCHC 34.1 08/10/2021    RDW 22.3 08/10/2021     08/10/2021    MPV 9.0 08/10/2021       Lab Results   Component Value Date     05/25/2021    K 4.1 05/25/2021     05/25/2021    CO2 28 05/25/2021    BUN 12 05/25/2021    LABALBU 4.3 05/25/2021    CREATININE 1.00 05/25/2021    CALCIUM 8.8 05/25/2021    GLUCOSE 89 05/25/2021       Lab Results Component Value Date    ALKPHOS 55 05/25/2021    ALT 10 05/25/2021    AST 17 05/25/2021    PROT 6.9 05/25/2021    BILITOT 0.3 05/25/2021    BILIDIR 0.1 05/25/2021    LABALBU 4.3 05/25/2021       No results found for: MG    No results found for: INR, PROTIME      No results found for: LABA1C    Lab Results   Component Value Date    TRIG 86 05/25/2021    HDL 71 05/25/2021    LDLCALC 140 05/25/2021    LABVLDL 17 05/25/2021       No results found for: TSH      Testing Reviewed:      I have individually reviewed the cardiac test below:    ECHO: No results found for this or any previous visit. Assessment/Plan   Atypical chest pain  Substernal pain relieved by SL nitro but not provoked with exertion. Not improved despite negative stress test, zoloft, bb, ASA, and SL nitro. Although patient has intermediate risk for ischemia this can also be associated with esophageal spasm.  - Plan for cardiac cath  - May consider EGD if cath is negative     Primary HTN  Controlled. In office OD=609/80. Home BP checks are similar   -Continue home BP     Disposition: Cardiac Cath  The patient is in agreement with and verbalizes understanding of the plan of care today and has no additional questions or complaints. Electronically signed by Peter Nunez DO on 1/24/2022 at 9:51 AM    Attending Supervising Physician's [address] Statement  I performed a history and physical examination on the patient and discussed the management with the resident physician. I reviewed and agree with the findings and plan as documented in the resident's note except for as noted below. Patient with recurrent chest pain, nitro improves, stress negative, but pre-test probability remains intermediate to high given the relief from NTG SL - may be vasospasm, or esophageal GERD/spasm. However, cannot be sure till cath is performed. R/B/A of the procedure discussed and she wants to proceed. Avoid heavy exertion. If all is negative, GI work-up. Discussed symptoms needing emergency care. Discussed diet/exercise/BP/weight loss/health lifestyle choices/lipids; the patient understands the goals and will try to comply.       Electronically signed by Vasile Grayson MD on 1/24/22 at 10:21 AM EST

## 2022-01-24 NOTE — TELEPHONE ENCOUNTER
At [facility] on 1/24/22 cardiac cath ordered. Offered cath on 2/2 and 2/4 but patient unable to come those days. She states 2/11/2022 works. Date given to scheduling.

## 2022-01-25 NOTE — TELEPHONE ENCOUNTER
LM for patient to call us to get cath scheduled. Cath set up for 2/11/22 at 10:00 patient to arrive at 8:00. Need to go over instructions with patient.

## 2022-02-10 ENCOUNTER — PREP FOR PROCEDURE (OUTPATIENT)
Dept: CARDIOLOGY | Age: 42
End: 2022-02-10

## 2022-02-10 RX ORDER — SODIUM CHLORIDE 9 MG/ML
25 INJECTION, SOLUTION INTRAVENOUS PRN
Status: CANCELLED | OUTPATIENT
Start: 2022-02-10

## 2022-02-10 RX ORDER — ASPIRIN 325 MG
325 TABLET ORAL ONCE
Status: CANCELLED | OUTPATIENT
Start: 2022-02-10 | End: 2022-02-10

## 2022-02-10 RX ORDER — SODIUM CHLORIDE 9 MG/ML
INJECTION, SOLUTION INTRAVENOUS CONTINUOUS
Status: CANCELLED | OUTPATIENT
Start: 2022-02-10

## 2022-02-10 RX ORDER — SODIUM CHLORIDE 0.9 % (FLUSH) 0.9 %
5-40 SYRINGE (ML) INJECTION EVERY 12 HOURS SCHEDULED
Status: CANCELLED | OUTPATIENT
Start: 2022-02-10

## 2022-02-10 RX ORDER — SODIUM CHLORIDE 0.9 % (FLUSH) 0.9 %
5-40 SYRINGE (ML) INJECTION PRN
Status: CANCELLED | OUTPATIENT
Start: 2022-02-10

## 2022-02-11 ENCOUNTER — HOSPITAL ENCOUNTER (OUTPATIENT)
Dept: INPATIENT UNIT | Age: 42
Discharge: HOME | End: 2022-02-11
Attending: ANESTHESIOLOGY | Admitting: ANESTHESIOLOGY
Payer: COMMERCIAL

## 2022-02-11 VITALS
RESPIRATION RATE: 14 BRPM | WEIGHT: 159 LBS | BODY MASS INDEX: 32.05 KG/M2 | OXYGEN SATURATION: 95 % | SYSTOLIC BLOOD PRESSURE: 104 MMHG | TEMPERATURE: 98.3 F | DIASTOLIC BLOOD PRESSURE: 74 MMHG | HEIGHT: 59 IN | HEART RATE: 70 BPM

## 2022-02-11 LAB
ABO: NORMAL
ANION GAP SERPL CALCULATED.3IONS-SCNC: 11 MEQ/L (ref 8–16)
ANTIBODY SCREEN: NORMAL
APTT: 34 SECONDS (ref 22–38)
BUN BLDV-MCNC: 12 MG/DL (ref 7–22)
CALCIUM SERPL-MCNC: 8.9 MG/DL (ref 8.5–10.5)
CHLORIDE BLD-SCNC: 104 MEQ/L (ref 98–111)
CHOLESTEROL, TOTAL: 252 MG/DL (ref 100–199)
CO2: 24 MEQ/L (ref 23–33)
CREAT SERPL-MCNC: 0.9 MG/DL (ref 0.4–1.2)
EKG ATRIAL RATE: 68 BPM
EKG P AXIS: 60 DEGREES
EKG P-R INTERVAL: 180 MS
EKG Q-T INTERVAL: 402 MS
EKG QRS DURATION: 78 MS
EKG QTC CALCULATION (BAZETT): 427 MS
EKG R AXIS: 13 DEGREES
EKG T AXIS: 8 DEGREES
EKG VENTRICULAR RATE: 68 BPM
ERYTHROCYTE [DISTWIDTH] IN BLOOD BY AUTOMATED COUNT: 12.9 % (ref 11.5–14.5)
ERYTHROCYTE [DISTWIDTH] IN BLOOD BY AUTOMATED COUNT: 44.6 FL (ref 35–45)
GFR SERPL CREATININE-BSD FRML MDRD: 69 ML/MIN/1.73M2
GLUCOSE BLD-MCNC: 92 MG/DL (ref 70–108)
HCT VFR BLD CALC: 37.5 % (ref 37–47)
HDLC SERPL-MCNC: 53 MG/DL
HEMOGLOBIN: 12.4 GM/DL (ref 12–16)
INR BLD: 0.93 (ref 0.85–1.13)
LDL CHOLESTEROL CALCULATED: 169 MG/DL
MCH RBC QN AUTO: 31.2 PG (ref 26–33)
MCHC RBC AUTO-ENTMCNC: 33.1 GM/DL (ref 32.2–35.5)
MCV RBC AUTO: 94.5 FL (ref 81–99)
PLATELET # BLD: 200 THOU/MM3 (ref 130–400)
PMV BLD AUTO: 10.4 FL (ref 9.4–12.4)
POTASSIUM SERPL-SCNC: 4.3 MEQ/L (ref 3.5–5.2)
PREGNANCY, SERUM: NEGATIVE
RBC # BLD: 3.97 MILL/MM3 (ref 4.2–5.4)
RH FACTOR: NORMAL
SODIUM BLD-SCNC: 139 MEQ/L (ref 135–145)
TRIGL SERPL-MCNC: 149 MG/DL (ref 0–199)
WBC # BLD: 5 THOU/MM3 (ref 4.8–10.8)

## 2022-02-11 PROCEDURE — 85027 COMPLETE CBC AUTOMATED: CPT

## 2022-02-11 PROCEDURE — 93458 L HRT ARTERY/VENTRICLE ANGIO: CPT | Performed by: INTERNAL MEDICINE

## 2022-02-11 PROCEDURE — 86900 BLOOD TYPING SEROLOGIC ABO: CPT

## 2022-02-11 PROCEDURE — 80048 BASIC METABOLIC PNL TOTAL CA: CPT

## 2022-02-11 PROCEDURE — 85610 PROTHROMBIN TIME: CPT

## 2022-02-11 PROCEDURE — 93005 ELECTROCARDIOGRAM TRACING: CPT | Performed by: PHYSICIAN ASSISTANT

## 2022-02-11 PROCEDURE — 6360000004 HC RX CONTRAST MEDICATION: Performed by: INTERNAL MEDICINE

## 2022-02-11 PROCEDURE — 2500000003 HC RX 250 WO HCPCS

## 2022-02-11 PROCEDURE — 6360000002 HC RX W HCPCS

## 2022-02-11 PROCEDURE — 85730 THROMBOPLASTIN TIME PARTIAL: CPT

## 2022-02-11 PROCEDURE — 93458 L HRT ARTERY/VENTRICLE ANGIO: CPT

## 2022-02-11 PROCEDURE — 86850 RBC ANTIBODY SCREEN: CPT

## 2022-02-11 PROCEDURE — 36415 COLL VENOUS BLD VENIPUNCTURE: CPT

## 2022-02-11 PROCEDURE — 2580000003 HC RX 258: Performed by: PHYSICIAN ASSISTANT

## 2022-02-11 PROCEDURE — C1769 GUIDE WIRE: HCPCS

## 2022-02-11 PROCEDURE — 80061 LIPID PANEL: CPT

## 2022-02-11 PROCEDURE — 84703 CHORIONIC GONADOTROPIN ASSAY: CPT

## 2022-02-11 PROCEDURE — C1894 INTRO/SHEATH, NON-LASER: HCPCS

## 2022-02-11 PROCEDURE — 86901 BLOOD TYPING SEROLOGIC RH(D): CPT

## 2022-02-11 PROCEDURE — 93010 ELECTROCARDIOGRAM REPORT: CPT | Performed by: NUCLEAR MEDICINE

## 2022-02-11 RX ORDER — SODIUM CHLORIDE 9 MG/ML
INJECTION, SOLUTION INTRAVENOUS CONTINUOUS
Status: DISCONTINUED | OUTPATIENT
Start: 2022-02-11 | End: 2022-02-11

## 2022-02-11 RX ORDER — DILTIAZEM HYDROCHLORIDE 120 MG/1
120 CAPSULE, COATED, EXTENDED RELEASE ORAL DAILY
Qty: 90 CAPSULE | Refills: 3 | Status: SHIPPED | OUTPATIENT
Start: 2022-02-11 | End: 2022-05-13 | Stop reason: SDUPTHER

## 2022-02-11 RX ORDER — SODIUM CHLORIDE 9 MG/ML
INJECTION, SOLUTION INTRAVENOUS CONTINUOUS
Status: DISCONTINUED | OUTPATIENT
Start: 2022-02-11 | End: 2022-02-11 | Stop reason: HOSPADM

## 2022-02-11 RX ORDER — ACETAMINOPHEN 325 MG/1
650 TABLET ORAL EVERY 4 HOURS PRN
Status: DISCONTINUED | OUTPATIENT
Start: 2022-02-11 | End: 2022-02-11 | Stop reason: HOSPADM

## 2022-02-11 RX ORDER — SODIUM CHLORIDE 9 MG/ML
25 INJECTION, SOLUTION INTRAVENOUS PRN
Status: DISCONTINUED | OUTPATIENT
Start: 2022-02-11 | End: 2022-02-11 | Stop reason: HOSPADM

## 2022-02-11 RX ORDER — ASPIRIN 325 MG
325 TABLET ORAL ONCE
Status: DISCONTINUED | OUTPATIENT
Start: 2022-02-11 | End: 2022-02-11 | Stop reason: HOSPADM

## 2022-02-11 RX ORDER — ATROPINE SULFATE 0.4 MG/ML
0.5 AMPUL (ML) INJECTION
Status: DISCONTINUED | OUTPATIENT
Start: 2022-02-11 | End: 2022-02-11 | Stop reason: HOSPADM

## 2022-02-11 RX ORDER — SODIUM CHLORIDE 0.9 % (FLUSH) 0.9 %
5-40 SYRINGE (ML) INJECTION PRN
Status: DISCONTINUED | OUTPATIENT
Start: 2022-02-11 | End: 2022-02-11 | Stop reason: HOSPADM

## 2022-02-11 RX ORDER — SODIUM CHLORIDE 0.9 % (FLUSH) 0.9 %
5-40 SYRINGE (ML) INJECTION EVERY 12 HOURS SCHEDULED
Status: DISCONTINUED | OUTPATIENT
Start: 2022-02-11 | End: 2022-02-11 | Stop reason: HOSPADM

## 2022-02-11 RX ADMIN — IOPAMIDOL 70 ML: 755 INJECTION, SOLUTION INTRAVENOUS at 15:00

## 2022-02-11 RX ADMIN — SODIUM CHLORIDE: 9 INJECTION, SOLUTION INTRAVENOUS at 08:40

## 2022-02-11 NOTE — BRIEF OP NOTE
[facility]  Sedation/Analgesia Post Sedation Record    Pt Name: Tre Pittman  Account number: [de-identified]  MRN: [id number]  YOB: 1980  Procedure Performed By: Nina Houston, MD MD Margorie Litten, [address]  Primary Care Physician: Shiela Hendrix, APRN - CNP  Date: 2/11/2022    POST-PROCEDURE    Physicians/Assistants: Nina Houston, MD MD Margorie Litten, CODIE    Procedure Performed:Cath      Sedation/Anesthesia: Versed/ Fentanyl and 2% xylocaine local anesthesia. Estimated Blood Loss: < 50 ml. Specimens Removed: None         Disposition of Specimen: N/A        Complications: No Immediate Complications.        Post-procedure Diagnosis/Findings:     No sig CAD  L dom system  EF preserved               Nina Houston, MD MD Margorie Litten, CODIE  Electronically signed 2/11/2022 at 3:35 PM  Interventional Cardiology

## 2022-02-11 NOTE — H&P
[address] [address]  Sedation/Analgesia History & Physical    Pt Name: Belinda Jimenez  Account number: [de-identified]  MRN: [id number]  YOB: 1980  Provider Performing Procedure: Baron Annika MD MD  Referring Provider: Jam Bland MD   Primary Care Physician: FLORA Davis - VERNON  Date: 2/11/2022    PRE-PROCEDURE    Code Status: FULL CODE  Brief History/Pre-Procedure Diagnosis:     [place]  Atypical CP  HTN     Consent: : I have discussed with the patient risks, benefits, and alternatives (and relevant risks, benefits, and side effects related to alternatives or not receiving care), and likelihood of the success. The patient and/or representative appear to understand and agree to proceed. The discussion encompasses risks, benefits, and side effects related to the alternatives and the risks related to not receiving the proposed care, treatment, and services. The indication, risks and benefits of the procedure and possible therapeutic consequences and alternatives were discussed with the patient. The patient was given the opportunity to ask questions and to have them answered to his/her satisfaction. Risks of the procedure include but are not limited to the following: Bleeding, hematoma including retroperitoneal hemmorhage, infection, pain and discomfort, injury to the aorta and other blood vessels, rhythm disturbance, low blood pressure, myocardial infarction, stroke, kidney damage/failure, myocardial perforation, allergic reactions to sedatives/contrast material, loss of pulse/vascular compromise requiring surgery, aneurysm/pseudoaneurysm formation, possible loss of a limb/hand/leg, needing blood transfusion, requiring emergent open heart surgery or emergent coronary intervention, the need for intubation/respiratory support, the requirement for defibrillation/cardioversion, and death. Alternatives to and omission of the suggested procedure were discussed.  The patient had no further questions and wished to proceed; the consent form was signed. MEDICAL HISTORY  [x]ASHD/ANGINA/MI/CHF   [x]Hypertension  []Diabetes  [x]Hyperlipidemia  []Smoking  []Family Hx of ASHD  []Additional information:       has a past medical history of Anxiety and Headache. SURGICAL HISTORY   has a past surgical history that includes  section; Uvulectomy; and Cholecystectomy. Additional information:       ALLERGIES   Allergies as of 2022 - Fully Reviewed 2022   Allergen Reaction Noted    Bupropion Hives 2018    Imdur [isosorbide nitrate]  10/20/2021     Additional information:       MEDICATIONS   Aspirin  [x] 81 mg  [] 325 mg  [] None  Antiplatelet drug therapy use last 5 days  [x]No []Yes  Coumadin Use Last 5 Days [x]No []Yes  Other anticoagulant use last 5 days  [x]No []Yes    Current Facility-Administered Medications:     0.9 % sodium chloride infusion, , IntraVENous, Continuous, Smiley Backbone, PA-C, Last Rate: 75 mL/hr at 22 0840, New Bag at 22 0840    0.9 % sodium chloride infusion, 25 mL, IntraVENous, PRN, Smiley Backbone, PA-C    aspirin tablet 325 mg, 325 mg, Oral, Once, Smiley Backbone, PA-C    sodium chloride flush 0.9 % injection 5-40 mL, 5-40 mL, IntraVENous, 2 times per day, Smiley Backbone, PA-C    sodium chloride flush 0.9 % injection 5-40 mL, 5-40 mL, IntraVENous, PRN, Smiley Backbone, PA-C  Prior to Admission medications    Medication Sig Start Date End Date Taking?  Authorizing Provider   hydrOXYzine (ATARAX) 25 MG tablet TAKE 1 TABLET BY MOUTH EVERY NIGHT AS NEEDED FOR ANXIETY 21  Yes FLORA Lay CNP   metoprolol succinate (TOPROL XL) 25 MG extended release tablet Take 1 tablet by mouth daily 12/10/21  Yes Mehdi Hyman MD   aspirin 81 MG EC tablet Take 81 mg by mouth daily   Yes Historical Provider, MD   sertraline (ZOLOFT) 50 MG tablet Take 1 tablet by mouth daily 21  Yes FLORA Lay CNP   ferrous sulfate (IRON 325) 325 (65 Fe) MG tablet Take 325 mg by mouth 3 times daily (with meals)  8/13/18  Yes Historical Provider, MD   PREVIDENT 5000 PLUS 1.1 % CREA  5/7/21   Historical Provider, MD   nitroGLYCERIN (NITROSTAT) 0.4 MG SL tablet Place 1 tablet under the tongue every 5 minutes as needed for Chest pain 4/26/21   Maria Mansfield MD     Additional information:       VITAL SIGNS   Vitals:    02/11/22 0803   BP: 103/69   Pulse:    Resp:    Temp:    SpO2:        PHYSICAL:   General: No acute distress  HEENT:  Unremarkable for age  Neck: without increased JVD, carotid pulses 2+ bilaterally without bruits  Heart: RRR, S1 & S2 WNL, S4 gallop, without murmurs or rubs   Lungs: Clear to auscultation    Abdomen: BS present, without HSM, masses, or tenderness    Extremities: without C,C,E.  Pulses 2+ bilaterally  Mental Status: Alert & Oriented        PLANNED PROCEDURE   [x]Cath  []PCI                []Pacemaker/AICD  []NEW             []Cardioversion []Peripheral angiography/PTA  []Other:      SEDATION  Planned agent:[x]Midazolam []Meperidine [x]Sublimaze []Morphine  []Diazepam  []Other:     ASA Classification:  []1 [x]2 []3 []4 []5  Class 1: A normal healthy patient  Class 2: Pt with mild to moderate systemic disease  Class 3: Severe systemic disease or disturbance  Class 4: Severe systemic disorders that are already life threatening. Class 5: Moribund pt with little chances of survival, for more than 24 hours. Mallampati I Airway Classification:   []1 [x]2 []3 []4    [x]Pre-procedure diagnostic studies complete and results available. Comment:    [x]Previous sedation/anesthesia experiences assessed. Comment:    [x]The patient is an appropriate candidate to undergo the planned procedure sedation and anesthesia. (Refer to nursing sedation/analgesia documentation record)  [x]Formulation and discussion of sedation/procedure plan, risks, and expectations with patient and/or responsible adult completed.   [x]Patient examined

## 2022-02-11 NOTE — PROGRESS NOTES
Returned to 2E09. Monitor attached showing SR. Vasc-band in place to right wrist.  No bleeding, swelling, or edema noted.   0.9nss infusing with approx 500 ml remaining

## 2022-02-12 NOTE — PROCEDURES
[address] [place] [address]                            CARDIAC CATHETERIZATION    PATIENT NAME: Stiven Howell                      :        1980  MED REC NO:   [id number]                           ROOM:       0009  ACCOUNT NO:   [de-identified]                           ADMIT DATE: 2022  PROVIDER:     Leeanne West MD    DATE OF PROCEDURE:  2022    INDICATION:  CCS class III angina, on optimal medical therapy with  recurrent chest pain. DESCRIPTION OF PROCEDURE:  After informed consent was obtained from the  patient, he was brought to the cardiac catheterization laboratory and  prepped in sterile fashion. Right radial artery was chosen as the  primary point of access. Preprocedure timeout was completed. After  infiltration of the right wrist with 2% lidocaine using micropuncture  and modified Seldinger technique under fluoroscopic guidance and  ultrasound guidance, I was able to insert a 6-Swiss Slender sheath into  the radial artery. Standard antispasmodic/antithrombotic cocktail was  given intraarterially. We then performed diagnostic coronary  angiography using 5-Swiss JL3.5 and 5-Swiss JR4 catheters. CORONARY ANGIOGRAM:  LEFT MAIN:  Patent without any significant obstruction. LAD:  Patent without any significant obstruction. Large diagonal branch  without any significant obstruction. LCX:  Provides left PDA without any significant obstruction. RCA:  RCA is nondominant. LV:  LVEF of 55% to 60%. No regional wall motion abnormality. Aortic  valve is tricuspid. No significant aneurysm or dissection in the  visualized portion of the aorta. LVEDP is 8. IMMEDIATE COMPLICATIONS:  None. MEDICATIONS:  See EMR. ACCESS:  Vasc Band was used for hemostasis. ESTIMATED BLOOD LOSS:  Less than 50 mL. SUMMARY:  No significant coronary artery disease, preserved LVEF. PLAN:  1.   Bedrest.  2.  Optimal medical therapy. 3.  Risk factor management. 4.  Routine access site care. 5.  Add diltiazem p.o. in addition to beta blocker as tolerated. 6.  Consider alternative causes for the patient's chest pain including  GI etiologies. 7.  We will treat empirically for vasospasms for the time being and  assess symptoms. All the above was explained to the patient and the patient's family. They are agreeable and amenable to the above plan.         Rayna Santana MD    D: 02/11/2022 18:31:17       T: 02/11/2022 18:53:17     [email or web address]  Job#: [id number]     Doc#: [id number]    CC:

## 2022-02-14 ENCOUNTER — TELEPHONE (OUTPATIENT)
Dept: CARDIOLOGY CLINIC | Age: 42
End: 2022-02-14

## 2022-02-14 NOTE — TELEPHONE ENCOUNTER
Lipid panel result note    ----- Message from Gilford Dub, MD sent at 2/12/2022 10:18 AM EST -----  Pcp to f/u

## 2022-02-15 NOTE — TELEPHONE ENCOUNTER
Patient verbalized understanding. Encounter sent to PCP. Attn Claudia-please review patient's lipid panel as Dr. Kelsey Look would like these results sent to PCP for review. Patient notified results will be addressed by PCP.

## 2022-02-16 NOTE — TELEPHONE ENCOUNTER
Lipid panel reviewed. Encourage patient to watch diet for the next few months including lower carb, fat, and sugar intake. Will be due for wellness this spring and can recheck lipid panel at that time to note any improvements.

## 2022-03-04 ENCOUNTER — HOSPITAL ENCOUNTER (EMERGENCY)
Age: 42
Discharge: HOME | End: 2022-03-04
Payer: COMMERCIAL

## 2022-03-04 VITALS
OXYGEN SATURATION: 98 % | RESPIRATION RATE: 16 BRPM | TEMPERATURE: 97 F | SYSTOLIC BLOOD PRESSURE: 134 MMHG | DIASTOLIC BLOOD PRESSURE: 78 MMHG | HEART RATE: 78 BPM
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
## 2022-03-04 PROCEDURE — 99213 OFFICE O/P EST LOW 20 MIN: CPT

## 2022-03-04 PROCEDURE — G0463 HOSPITAL OUTPT CLINIC VISIT: HCPCS

## 2022-03-04 PROCEDURE — 99213 OFFICE O/P EST LOW 20 MIN: CPT | Performed by: EMERGENCY MEDICINE

## 2022-03-04 RX ORDER — PERMETHRIN 50 MG/G
CREAM TOPICAL
Qty: 1 EACH | Refills: 0 | Status: SHIPPED | OUTPATIENT
Start: 2022-03-04

## 2022-03-04 ASSESSMENT — ENCOUNTER SYMPTOMS
COUGH: 0
SHORTNESS OF BREATH: 0

## 2022-03-04 NOTE — ED PROVIDER NOTES
[facility]       CHIEF COMPLAINT       Chief Complaint   Patient presents with    Other     possible scabbies / bed bugs        Nurses Notes reviewed and I agree except as noted in the HPI. HISTORY OF PRESENT ILLNESS   Breezy Martinez is a 39 y.o. female who presents for complaints of insect bites to her neck and bilateral arms. These started developing today. Patient reports that she stayed at a hotel with some friends 6 days ago. Her friend started developing symptoms of bedbug bites. She made sure that she washed any clothing in hot water and dried on high heat. However, bite started appearing this morning. The lesions are red, raised but do not itch. HPI    REVIEW OF SYSTEMS     Review of Systems   Constitutional: Negative for diaphoresis, fatigue and fever. Respiratory: Negative for cough and shortness of breath. Skin: Positive for rash (bites to arms and neck). PAST MEDICAL HISTORY         Diagnosis Date    Anxiety     Headache     usually in occipital area. SURGICALHISTORY     Patient  has a past surgical history that includes  section; Uvulectomy; Cholecystectomy; and Cardiac catheterization (2022).     CURRENT MEDICATIONS       Discharge Medication List as of 3/4/2022  2:16 PM      CONTINUE these medications which have NOT CHANGED    Details   dilTIAZem (CARDIZEM CD) 120 MG extended release capsule Take 1 capsule by mouth daily, Disp-90 capsule, R-3Normal      hydrOXYzine (ATARAX) 25 MG tablet TAKE 1 TABLET BY MOUTH EVERY NIGHT AS NEEDED FOR ANXIETY, Disp-30 tablet, R-3Normal      metoprolol succinate (TOPROL XL) 25 MG extended release tablet Take 1 tablet by mouth daily, Disp-90 tablet, R-3Normal      aspirin 81 MG EC tablet Take 81 mg by mouth dailyHistorical Med      sertraline (ZOLOFT) 50 MG tablet Take 1 tablet by mouth daily, Disp-90 tablet, R-3Normal      nitroGLYCERIN (NITROSTAT) 0.4 MG SL tablet Place 1 tablet under the tongue every 5 minutes as needed for Chest pain, Disp-25 tablet, R-3Normal      ferrous sulfate (IRON 325) 325 (65 Fe) MG tablet Take 325 mg by mouth 3 times daily (with meals) Historical Med             ALLERGIES     Patient is is allergic to bupropion and imdur [isosorbide nitrate]. Patients   Immunization History   Administered Date(s) Administered    COVID-19, Pfizer Purple top, DILUTE for use, 12+ yrs, 30mcg/0.3mL dose 04/05/2021, 04/23/2021, 11/05/2021    Influenza Virus Vaccine 09/10/2012       FAMILY HISTORY     Patient's family history includes COPD in her mother; Cancer in her father and mother; Heart Disease in her father. SOCIAL HISTORY     Patient  reports that she has never smoked. She has never used smokeless tobacco. She reports current alcohol use. She reports that she does not use drugs. PHYSICAL EXAM     ED TRIAGE VITALS  BP: 134/78, Temp: 97 °F (36.1 °C), Pulse: 78, Resp: 16, SpO2: 98 %,Estimated body mass index is 32.11 kg/m² as calculated from the following:    Height as of 2/11/22: 4' 11\" (1.499 m). Weight as of 2/11/22: 159 lb (72.1 kg). ,No LMP recorded. Physical Exam  Constitutional:       General: She is not in acute distress. Appearance: She is normal weight. She is not ill-appearing. HENT:      Head: Normocephalic. Cardiovascular:      Rate and Rhythm: Normal rate. Pulmonary:      Breath sounds: Normal breath sounds. Skin:            Comments: Patient has what appears to be insect bites to the back of the right and left upper arm, posterior neck. Lesions are red, raised without central appearing laura. These are likely bedbug bites   Neurological:      General: No focal deficit present. Mental Status: She is alert. Psychiatric:         Mood and Affect: Mood normal.         Behavior: Behavior normal.         DIAGNOSTIC RESULTS     Labs:No results found for this visit on 03/04/22.     IMAGING:    No orders to display EKG:      URGENT CARE COURSE:     Vitals:    03/04/22 1413   BP: 134/78   Pulse: 78   Resp: 16   Temp: 97 °F (36.1 °C)   TempSrc: Tympanic   SpO2: 98%       Medications - No data to display         PROCEDURES:  None    FINAL IMPRESSION      1. Bedbug bite, initial encounter          DISPOSITION/ PLAN     Presents for what is likely bedbug bites. Will place patient on permethrin for treatment of symptoms. Advised to launder all bed linens and clothing with hot water, dry on high heat. Hydrocortisone cream as needed for itching or swelling of the bites.   Advised to return for new or worsening symptoms      PATIENT REFERRED TO:  Zahra Hire, APRN - CNP  [address] [place] [id number]      DISCHARGE MEDICATIONS:  Discharge Medication List as of 3/4/2022  2:16 PM      START taking these medications    Details   permethrin (ELIMITE) 5 % cream Apply topically as directed, Disp-1 each, R-0, Normal             Discharge Medication List as of 3/4/2022  2:16 PM          Discharge Medication List as of 3/4/2022  2:16 PM          FLORA Soto CNP    (Please note that portions of this note were completed with a voice recognition program. Efforts were made to edit the dictations but occasionally words are mis-transcribed.)          FLORA Soto CNP  03/04/22 2029

## 2022-03-16 ENCOUNTER — HOSPITAL ENCOUNTER (OUTPATIENT)
Dept: MAMMOGRAPHY | Age: 42
Discharge: HOME | End: 2022-03-16
Payer: COMMERCIAL

## 2022-03-16 DIAGNOSIS — Z12.39 BREAST SCREENING: ICD-10-CM

## 2022-03-16 PROCEDURE — 77063 BREAST TOMOSYNTHESIS BI: CPT

## 2022-05-13 PROBLEM — D50.9 IRON DEFICIENCY ANEMIA: Status: ACTIVE | Noted: 2017-03-22

## 2022-05-13 PROBLEM — G47.00 INSOMNIA: Status: ACTIVE | Noted: 2022-05-13

## 2022-10-13 ENCOUNTER — HOSPITAL ENCOUNTER (OUTPATIENT)
Age: 42
Setting detail: OUTPATIENT SURGERY
Discharge: HOME | End: 2022-10-13
Attending: INTERNAL MEDICINE | Admitting: INTERNAL MEDICINE
Payer: COMMERCIAL

## 2022-10-13 VITALS
WEIGHT: 163 LBS | TEMPERATURE: 96.7 F | SYSTOLIC BLOOD PRESSURE: 115 MMHG | BODY MASS INDEX: 32.86 KG/M2 | HEIGHT: 59 IN | DIASTOLIC BLOOD PRESSURE: 87 MMHG | RESPIRATION RATE: 16 BRPM | HEART RATE: 72 BPM

## 2022-10-13 PROCEDURE — 3609015500 HC GASTRIC/DUODENAL MOTILITY &/OR MANOMETRY STUDY: Performed by: INTERNAL MEDICINE

## 2022-10-13 ASSESSMENT — PAIN - FUNCTIONAL ASSESSMENT: PAIN_FUNCTIONAL_ASSESSMENT: NONE - DENIES PAIN

## 2022-10-13 NOTE — PROGRESS NOTES
Patient admitted to endo dept for EFT. Consent signed. Manometry probe passed through the right nare into the stomach.    Liquid swallows performed at 46cm  Discharge instructions given to patient   Understanding verbalized   Pt discharged home per self

## 2022-10-14 NOTE — PROCEDURES
[address] [place]  [address]                High Resolution Esophageal Manometry Study      Patient:  Nakia Stephenson    [id number] Gender: Female Physician: DR. Rowena Padilla     / Age: 1980 : Nupur Klein    Height: 4 ft 11 in Referring Physician: DR. BAUGH    Procedure: Esophageal Manometry with Impedance Examination Date: 10/13/2022     Lower Esophageal Sphincter Region  Normal Esophageal Motility  Normal   Landmarks   Number of swallows evaluated 11        LES midpoint (from nares)(cm) 35.1  High Resolution Parameters         Proximal LES (from nares)(cm) 34.0      Distal contractile integral(mean)(mmHg-cm-s) 2315.8 500-5000       Distal LES (from nares)(cm) 38.7      Distal contractile integral(highest)(mmHg-cm-s) 3358.2        LES length(cm) 4.7 2.7-4.8     Contractile front velocity(cm/s) 2.0 <9.0       Esophageal length (LES-UES centers)(cm) 20.1  [place] Classification         PIP (from nares)(cm) 35.5      Distal latency 8.0        Intraabdominal LES length(cm) 3.2      % failed ([place] Classification) 0        Hiatal hernia? No      % panesophageal pressurization 0    LES Pressures       % premature contraction 0        Pressure argenis. method eSleeve,IRP      % rapid contraction 0        Basal (respiratory min. )(mmHg) 13.6 4.8-32.0     % large breaks 0        Basal (respiratory mean)(mmHg) 22.5 13-43     % small breaks 0        Residual (mean)(mmHg) 0.2 <15.0 Impedance analysis            Incomplete bolus clearance(%) 0            Upper Esophageal Sphincter  Normal Pharyngeal / UES Motility  Normal   Location (center, fr. nares)(cm) 15.0  No. swallows evaluated 11    Mean basal pressure(mmHg) 82.6  Evaluated @ 3.0 & N/A above UES     Mean residual pressure(mmHg) 10.8 <12.0     Mean peak pressure(mmHg) 5.7           [place] Classification Findings*   No [place] Classification abnormality found  * Findings are based on published [place] Classification scheme and are only intended to serve as a guide for patient diagnosis     Procedure   After confirmation of potential allergies, a topical analgesic was used to numb the nares followed by trans-nasal insertion of a High Resolution Manometry Catheter. Pressure bands of both UES and LES were observed on the color contour. Patient instructed to take deep breath to verify placement of catheter; diaphragmatic pinch noted on inspiration. Patient was assisted to supine position and catheter was stabilized. Patient encouraged to relax while acclimating to catheter for approximately 5 minutes. A 30 second baseline pressure was obtained to identify the UES and LES followed by a series of wet swallows using 5 ccs room temperature water to assess esophageal motility. At the conclusion of the procedure; the catheter was removed. Indications   CHEST PAIN     Interpretation / Findings   UES Pressure and relaxation:  normal  Peristalsis: normal  Esophageal pressures:  normal  GEJ relaxation:  complete  Incomplete bolus clearance: <10%     Impressions   Normal study with normal anatomy and physiology. There is no hiatal hernia.     German Salmon MD  2:44 PM 10/14/2022

## 2022-11-08 ENCOUNTER — HOSPITAL ENCOUNTER (EMERGENCY)
Age: 42
Discharge: HOME | End: 2022-11-08
Payer: COMMERCIAL

## 2022-11-08 VITALS
BODY MASS INDEX: 33.06 KG/M2 | OXYGEN SATURATION: 97 % | RESPIRATION RATE: 16 BRPM | TEMPERATURE: 97.4 F | WEIGHT: 164 LBS | SYSTOLIC BLOOD PRESSURE: 112 MMHG | HEART RATE: 67 BPM | DIASTOLIC BLOOD PRESSURE: 71 MMHG | HEIGHT: 59 IN

## 2022-11-08 DIAGNOSIS — J01.90 ACUTE SINUSITIS, RECURRENCE NOT SPECIFIED, UNSPECIFIED LOCATION: Primary | ICD-10-CM

## 2022-11-08 DIAGNOSIS — K13.79 MOUTH SORE: ICD-10-CM

## 2022-11-08 LAB
GROUP A STREP CULTURE, REFLEX: NEGATIVE
REFLEX THROAT C + S: NORMAL

## 2022-11-08 PROCEDURE — 87880 STREP A ASSAY W/OPTIC: CPT

## 2022-11-08 PROCEDURE — 99213 OFFICE O/P EST LOW 20 MIN: CPT

## 2022-11-08 PROCEDURE — 87070 CULTURE OTHR SPECIMN AEROBIC: CPT

## 2022-11-08 PROCEDURE — 99213 OFFICE O/P EST LOW 20 MIN: CPT | Performed by: NURSE PRACTITIONER

## 2022-11-08 RX ORDER — IBUPROFEN 400 MG/1
800 TABLET ORAL EVERY 6 HOURS PRN
COMMUNITY

## 2022-11-08 RX ORDER — DOXYCYCLINE HYCLATE 100 MG
100 TABLET ORAL 2 TIMES DAILY
Qty: 14 TABLET | Refills: 0 | Status: SHIPPED | OUTPATIENT
Start: 2022-11-08 | End: 2022-11-15

## 2022-11-08 ASSESSMENT — ENCOUNTER SYMPTOMS
SINUS PRESSURE: 0
SHORTNESS OF BREATH: 0
VOMITING: 0
EYE REDNESS: 0
ABDOMINAL PAIN: 0
EYE ITCHING: 0
NAUSEA: 0
COUGH: 0
CHEST TIGHTNESS: 0
DIARRHEA: 0
SORE THROAT: 1

## 2022-11-08 ASSESSMENT — PAIN - FUNCTIONAL ASSESSMENT
PAIN_FUNCTIONAL_ASSESSMENT: PREVENTS OR INTERFERES SOME ACTIVE ACTIVITIES AND ADLS
PAIN_FUNCTIONAL_ASSESSMENT: 0-10

## 2022-11-08 ASSESSMENT — PAIN DESCRIPTION - PAIN TYPE: TYPE: ACUTE PAIN

## 2022-11-08 ASSESSMENT — PAIN DESCRIPTION - LOCATION: LOCATION: THROAT

## 2022-11-08 ASSESSMENT — PAIN SCALES - GENERAL: PAINLEVEL_OUTOF10: 3

## 2022-11-08 NOTE — ED PROVIDER NOTES
[address] Encounter      CHIEFCOMPLAINT       Chief Complaint   Patient presents with    Pharyngitis       Nurses Notes reviewed and I agree except as noted in the HPI. HISTORY OF PRESENT ILLNESS   Mono Napoles is a 43 y.o. female who presents to the urgent care for evaluation of a sore throat that started 22. She is also concerned about a sore under her tongue. The patient/patient representative has no other acute complaints at this time. REVIEW OF SYSTEMS     Review of Systems   Constitutional:  Negative for chills, fatigue and fever. HENT:  Positive for mouth sores and sore throat. Negative for congestion, ear pain and sinus pressure. Eyes:  Negative for redness and itching. Respiratory:  Negative for cough, chest tightness and shortness of breath. Cardiovascular:  Negative for chest pain. Gastrointestinal:  Negative for abdominal pain, diarrhea, nausea and vomiting. Skin:  Negative for rash. Allergic/Immunologic: Negative for environmental allergies and food allergies. Neurological:  Negative for headaches. Hematological:  Negative for adenopathy. PAST MEDICAL HISTORY         Diagnosis Date    Anxiety     Headache     usually in occipital area. SURGICAL HISTORY     Patient  has a past surgical history that includes  section; Uvulectomy; Cholecystectomy; Cardiac catheterization (2022); and esophageal motility study (N/A, 10/13/2022).     CURRENT MEDICATIONS       Discharge Medication List as of 2022  4:37 PM        CONTINUE these medications which have NOT CHANGED    Details   ibuprofen (ADVIL;MOTRIN) 400 MG tablet Take 800 mg by mouth every 6 hours as needed for PainHistorical Med      NONFORMULARY Indications: combo med walfedHistorical Med      famotidine (PEPCID) 40 MG tablet Take 40 mg by mouth 2 times daily (with meals)Historical Med      pantoprazole (PROTONIX) 40 MG tablet Take 40 mg by mouth dailyHistorical Med      sertraline (ZOLOFT) 50 MG tablet Take 1 tablet by mouth daily, Disp-90 tablet, R-3Normal      dilTIAZem (CARDIZEM CD) 120 MG extended release capsule Take 1 capsule by mouth daily, Disp-90 capsule, R-3Normal      hydrOXYzine (ATARAX) 25 MG tablet Take 1 tablet by mouth nightly as needed for Anxiety, Disp-90 tablet, R-3Due for wellnessNormal      permethrin (ELIMITE) 5 % cream Apply topically as directed, Disp-1 each, R-0, Normal      metoprolol succinate (TOPROL XL) 25 MG extended release tablet Take 1 tablet by mouth daily, Disp-90 tablet, R-3Normal      aspirin 81 MG EC tablet Take 81 mg by mouth dailyHistorical Med      nitroGLYCERIN (NITROSTAT) 0.4 MG SL tablet Place 1 tablet under the tongue every 5 minutes as needed for Chest pain, Disp-25 tablet, R-3Normal      ferrous sulfate (IRON 325) 325 (65 Fe) MG tablet Take 325 mg by mouth 3 times daily (with meals) Historical Med             ALLERGIES     Patient is is allergic to bupropion and imdur [isosorbide nitrate]. FAMILY HISTORY     Patient'sfamily history includes COPD in her mother; Esophageal Cancer (age of onset: 46) in her father; Heart Disease in her father; Lung Cancer (age of onset: 79) in her mother. SOCIAL HISTORY     Patient  reports that she has never smoked. She has never used smokeless tobacco. She reports current alcohol use. She reports that she does not use drugs. PHYSICAL EXAM     ED TRIAGE VITALS  BP: 112/71, Temp: 97.4 °F (36.3 °C), Heart Rate: 67, Resp: 16, SpO2: 97 %  Physical Exam  Vitals and nursing note reviewed. Constitutional:       General: She is not in acute distress. Appearance: Normal appearance. She is well-developed and well-groomed. HENT:      Head: Normocephalic and atraumatic. Right Ear: External ear normal.      Left Ear: External ear normal.      Nose: Nose normal.      Mouth/Throat:      Lips: Pink.       Mouth: Mucous membranes are moist.      Tongue: Lesions (Appears as a canker sore, underneath the right tongue) present. Pharynx: Uvula midline. Posterior oropharyngeal erythema present. Eyes:      Conjunctiva/sclera: Conjunctivae normal.      Right eye: Right conjunctiva is not injected. Left eye: Left conjunctiva is not injected. Pupils: Pupils are equal.   Cardiovascular:      Rate and Rhythm: Normal rate. Heart sounds: Normal heart sounds. Pulmonary:      Effort: Pulmonary effort is normal. No respiratory distress. Breath sounds: Normal breath sounds. Musculoskeletal:      Cervical back: Normal range of motion. Skin:     General: Skin is warm and dry. Findings: No rash (on exposed surfaces). Neurological:      Mental Status: She is alert and oriented to person, place, and time. Gait: Gait is intact. Psychiatric:         Mood and Affect: Mood normal.         Speech: Speech normal.         Behavior: Behavior is cooperative. DIAGNOSTIC RESULTS   Labs:  Abnormal Labs Reviewed - No abnormal labs to display     IMAGING:  No orders to display     URGENT CARE COURSE:     Vitals:    11/08/22 1609   BP: 112/71   Pulse: 67   Resp: 16   Temp: 97.4 °F (36.3 °C)   TempSrc: Temporal   SpO2: 97%   Weight: 164 lb (74.4 kg)   Height: 4' 11\" (1.499 m)       Medications - No data to display  PROCEDURES:  FINALIMPRESSION      1. Acute sinusitis, recurrence not specified, unspecified location    2.  Mouth sore        DISPOSITION/PLAN   DISPOSITION Decision To Discharge 11/08/2022 04:33:51 PM        ED Course as of 11/08/22 1803   Tue Nov 08, 2022   1633 GROUP A STREP CULTURE, REFLEX: Negative [HA]      ED Course User Index  Sean Bagley, APRN - CNP       Problem List Items Addressed This Visit    None  Visit Diagnoses       Acute sinusitis, recurrence not specified, unspecified location    -  Primary    Relevant Medications    doxycycline hyclate (VIBRA-TABS) 100 MG tablet    Magic Mouthwash (MIRACLE MOUTHWASH)    Mouth sore        Relevant Medications    Magic Mouthwash (MIRACLE MOUTHWASH)            Physical assessment findings, diagnostic testing(s) if applicable, and vital signs reviewed with patient/patient representative. Differential diagnosis(s) discussed with patient/patient representative. Prescription medications and/or over-the-counter medications for symptom management discussed. Patient is to follow-up with family care provider in 2-3 days if no improvement. If symptoms should worsen or change, go to the ED. Patient/patient representative is aware of care plan, questions answered, verbalizes understanding and is in agreement. Printed instructions attached to after visit summary. If COVID-19 positive or COVID-19 by PCR is pending at time of discharge patient is to quarantine/isolate according to [facility] guidelines. PATIENT REFERRED TO:  FLORA Do CNP  [facility]  [phone]    Schedule an appointment as soon as possible for a visit in 3 days  For further evaluation. , If symptoms change/worsen, go to the [facility], APRN - CNP    Please note that some or all of this chart was generated using [employer] voice recognition software. Although every effort was made to ensure the accuracy of this automated transcription, some errors in transcription may have occurred.         FLORA Gaines CNP  11/08/22 3476

## 2022-11-10 LAB — THROAT/NOSE CULTURE: NORMAL

## 2022-11-10 RX ORDER — AMOXICILLIN AND CLAVULANATE POTASSIUM 875; 125 MG/1; MG/1
1 TABLET, FILM COATED ORAL 2 TIMES DAILY
Qty: 14 TABLET | Refills: 0 | Status: SHIPPED | OUTPATIENT
Start: 2022-11-10 | End: 2022-11-17

## 2022-12-16 PROBLEM — K22.70 BARRETT'S ESOPHAGUS DETERMINED BY ENDOSCOPY: Status: ACTIVE | Noted: 2022-12-16

## 2022-12-28 ENCOUNTER — HOSPITAL ENCOUNTER (OUTPATIENT)
Dept: NON INVASIVE DIAGNOSTICS | Age: 42
Discharge: HOME | End: 2022-12-28
Payer: COMMERCIAL

## 2022-12-28 DIAGNOSIS — R00.0 TACHYCARDIA: ICD-10-CM

## 2022-12-28 PROCEDURE — 93225 XTRNL ECG REC<48 HRS REC: CPT

## 2022-12-28 PROCEDURE — 93226 XTRNL ECG REC<48 HR SCAN A/R: CPT

## 2023-03-24 ENCOUNTER — HOSPITAL ENCOUNTER (OUTPATIENT)
Dept: MAMMOGRAPHY | Age: 43
Discharge: HOME | End: 2023-03-24
Payer: COMMERCIAL

## 2023-03-24 DIAGNOSIS — Z12.39 BREAST SCREENING: ICD-10-CM

## 2023-03-24 PROCEDURE — 77063 BREAST TOMOSYNTHESIS BI: CPT

## 2023-05-09 ENCOUNTER — HOSPITAL ENCOUNTER (EMERGENCY)
Age: 43
Discharge: HOME | End: 2023-05-09
Payer: COMMERCIAL

## 2023-05-09 VITALS
DIASTOLIC BLOOD PRESSURE: 74 MMHG | TEMPERATURE: 97.8 F | WEIGHT: 165 LBS | SYSTOLIC BLOOD PRESSURE: 119 MMHG | BODY MASS INDEX: 33.26 KG/M2 | HEART RATE: 65 BPM | RESPIRATION RATE: 16 BRPM | HEIGHT: 59 IN | OXYGEN SATURATION: 97 %

## 2023-05-09 DIAGNOSIS — H10.9 CONJUNCTIVITIS OF BOTH EYES, UNSPECIFIED CONJUNCTIVITIS TYPE: Primary | ICD-10-CM

## 2023-05-09 PROCEDURE — 99213 OFFICE O/P EST LOW 20 MIN: CPT

## 2023-05-09 PROCEDURE — 99213 OFFICE O/P EST LOW 20 MIN: CPT | Performed by: NURSE PRACTITIONER

## 2023-05-09 RX ORDER — POLYMYXIN B SULFATE AND TRIMETHOPRIM 1; 10000 MG/ML; [USP'U]/ML
1 SOLUTION OPHTHALMIC EVERY 4 HOURS
Qty: 10 ML | Refills: 0 | Status: SHIPPED | OUTPATIENT
Start: 2023-05-09 | End: 2023-05-12

## 2023-05-09 ASSESSMENT — ENCOUNTER SYMPTOMS
EYE REDNESS: 1
DIARRHEA: 0
ABDOMINAL PAIN: 0
SHORTNESS OF BREATH: 0
EYE DISCHARGE: 1
VOMITING: 0
SINUS PRESSURE: 0
CONSTIPATION: 0
SINUS PAIN: 0
SORE THROAT: 0
WHEEZING: 0
NAUSEA: 0
RHINORRHEA: 0

## 2023-05-09 ASSESSMENT — PAIN - FUNCTIONAL ASSESSMENT: PAIN_FUNCTIONAL_ASSESSMENT: NONE - DENIES PAIN

## 2023-05-09 ASSESSMENT — VISUAL ACUITY: OU: 1

## 2023-05-09 NOTE — ED PROVIDER NOTES
General: Lids are normal. Lids are everted, no foreign bodies appreciated. Vision grossly intact. Gaze aligned appropriately. Right eye: Discharge present. Extraocular Movements: Extraocular movements intact. Conjunctiva/sclera:      Right eye: Right conjunctiva is injected. Left eye: Left conjunctiva is injected. Pupils: Pupils are equal, round, and reactive to light. Cardiovascular:      Rate and Rhythm: Normal rate and regular rhythm. Pulses: Normal pulses. Heart sounds: Normal heart sounds. No murmur heard. Pulmonary:      Effort: Pulmonary effort is normal. No respiratory distress. Breath sounds: Normal breath sounds. No wheezing. Abdominal:      General: Abdomen is flat. Palpations: Abdomen is soft. Tenderness: There is no abdominal tenderness. Musculoskeletal:         General: No swelling or deformity. Normal range of motion. Cervical back: Normal range of motion and neck supple. Skin:     General: Skin is warm and dry. Capillary Refill: Capillary refill takes less than 2 seconds. Findings: No rash. Neurological:      General: No focal deficit present. Mental Status: She is alert and oriented to person, place, and time. Mental status is at baseline. Psychiatric:         Mood and Affect: Mood normal.         Behavior: Behavior normal.         Thought Content: Thought content normal.         Judgment: Judgment normal.       DIAGNOSTIC RESULTS   Labs:No results found for this visit on 05/09/23. IMAGING:  No orders to display     URGENT CARE COURSE:         Medications - No data to display  PROCEDURES:  FINALIMPRESSION      1. Conjunctivitis of both eyes, unspecified conjunctivitis type        DISPOSITION/PLAN   DISPOSITION Decision To Discharge 05/09/2023 09:54:17 AM    Consistent with bilateral bacterial conjunctivitis. Will provide work note. Will start on Polytrim.   Advised to continue to keep the eyes clean with a warm

## 2024-01-05 ENCOUNTER — HOSPITAL ENCOUNTER (OUTPATIENT)
Age: 44
Discharge: HOME | End: 2024-01-05
Payer: COMMERCIAL

## 2024-01-05 DIAGNOSIS — E78.2 MIXED HYPERLIPIDEMIA: ICD-10-CM

## 2024-01-05 LAB
ALBUMIN SERPL BCG-MCNC: 4.3 G/DL (ref 3.5–5.1)
ALP SERPL-CCNC: 57 U/L (ref 38–126)
ALT SERPL W/O P-5'-P-CCNC: 8 U/L (ref 11–66)
AST SERPL-CCNC: 16 U/L (ref 5–40)
BILIRUB CONJ SERPL-MCNC: < 0.2 MG/DL (ref 0–0.3)
BILIRUB SERPL-MCNC: 0.2 MG/DL (ref 0.3–1.2)
CHOLESTEROL, FASTING: 177 MG/DL (ref 100–199)
HDLC SERPL-MCNC: 51 MG/DL
LDLC SERPL CALC-MCNC: 107 MG/DL
PROT SERPL-MCNC: 6.9 G/DL (ref 6.1–8)
TRIGLYCERIDE, FASTING: 95 MG/DL (ref 0–199)

## 2024-01-05 PROCEDURE — 80061 LIPID PANEL: CPT

## 2024-01-05 PROCEDURE — 80076 HEPATIC FUNCTION PANEL: CPT

## 2024-01-05 PROCEDURE — 36415 COLL VENOUS BLD VENIPUNCTURE: CPT

## 2024-01-08 ENCOUNTER — HOSPITAL ENCOUNTER (OUTPATIENT)
Age: 44
Discharge: HOME | End: 2024-01-08
Payer: COMMERCIAL

## 2024-01-08 DIAGNOSIS — R25.3 MUSCLE TWITCHING: ICD-10-CM

## 2024-01-08 DIAGNOSIS — R41.3 MEMORY CHANGES: ICD-10-CM

## 2024-01-08 DIAGNOSIS — D50.9 IRON DEFICIENCY ANEMIA, UNSPECIFIED IRON DEFICIENCY ANEMIA TYPE: ICD-10-CM

## 2024-01-08 LAB
25(OH)D3 SERPL-MCNC: 34 NG/ML (ref 30–100)
ANION GAP SERPL CALC-SCNC: 10 MEQ/L (ref 8–16)
BASOPHILS ABSOLUTE: 0.1 THOU/MM3 (ref 0–0.1)
BASOPHILS NFR BLD AUTO: 2 %
BUN SERPL-MCNC: 14 MG/DL (ref 7–22)
CALCIUM SERPL-MCNC: 9.1 MG/DL (ref 8.5–10.5)
CHLORIDE SERPL-SCNC: 106 MEQ/L (ref 98–111)
CO2 SERPL-SCNC: 26 MEQ/L (ref 23–33)
CREAT SERPL-MCNC: 1 MG/DL (ref 0.4–1.2)
DEPRECATED RDW RBC AUTO: 47.7 FL (ref 35–45)
EOSINOPHIL NFR BLD AUTO: 3.2 %
EOSINOPHILS ABSOLUTE: 0.2 THOU/MM3 (ref 0–0.4)
ERYTHROCYTE [DISTWIDTH] IN BLOOD BY AUTOMATED COUNT: 13.6 % (ref 11.5–14.5)
FOLATE SERPL-MCNC: 5.5 NG/ML (ref 4.8–24.2)
GFR SERPL CREATININE-BSD FRML MDRD: > 60 ML/MIN/1.73M2
GLUCOSE SERPL-MCNC: 90 MG/DL (ref 70–108)
HCT VFR BLD AUTO: 38 % (ref 37–47)
HGB BLD-MCNC: 12.3 GM/DL (ref 12–16)
IMM GRANULOCYTES # BLD AUTO: 0.01 THOU/MM3 (ref 0–0.07)
IMM GRANULOCYTES NFR BLD AUTO: 0.2 %
LYMPHOCYTES ABSOLUTE: 2.1 THOU/MM3 (ref 1–4.8)
LYMPHOCYTES NFR BLD AUTO: 37.7 %
MAGNESIUM SERPL-MCNC: 2.5 MG/DL (ref 1.6–2.4)
MCH RBC QN AUTO: 30.9 PG (ref 26–33)
MCHC RBC AUTO-ENTMCNC: 32.4 GM/DL (ref 32.2–35.5)
MCV RBC AUTO: 95.5 FL (ref 81–99)
MONOCYTES ABSOLUTE: 0.5 THOU/MM3 (ref 0.4–1.3)
MONOCYTES NFR BLD AUTO: 9.2 %
NEUTROPHILS NFR BLD AUTO: 47.7 %
NRBC BLD AUTO-RTO: 0 /100 WBC
PLATELET # BLD AUTO: 212 THOU/MM3 (ref 130–400)
PMV BLD AUTO: 11 FL (ref 9.4–12.4)
POTASSIUM SERPL-SCNC: 4.2 MEQ/L (ref 3.5–5.2)
RBC # BLD AUTO: 3.98 MILL/MM3 (ref 4.2–5.4)
SEGMENTED NEUTROPHILS ABSOLUTE COUNT: 2.6 THOU/MM3 (ref 1.8–7.7)
SODIUM SERPL-SCNC: 142 MEQ/L (ref 135–145)
TSH SERPL DL<=0.005 MIU/L-ACNC: 2.7 UIU/ML (ref 0.4–4.2)
VIT B12 SERPL-MCNC: 639 PG/ML (ref 211–911)
WBC # BLD AUTO: 5.5 THOU/MM3 (ref 4.8–10.8)

## 2024-01-08 PROCEDURE — 82746 ASSAY OF FOLIC ACID SERUM: CPT

## 2024-01-08 PROCEDURE — 82607 VITAMIN B-12: CPT

## 2024-01-08 PROCEDURE — 80048 BASIC METABOLIC PNL TOTAL CA: CPT

## 2024-01-08 PROCEDURE — 84443 ASSAY THYROID STIM HORMONE: CPT

## 2024-01-08 PROCEDURE — 36415 COLL VENOUS BLD VENIPUNCTURE: CPT

## 2024-01-08 PROCEDURE — 83735 ASSAY OF MAGNESIUM: CPT

## 2024-01-08 PROCEDURE — 82306 VITAMIN D 25 HYDROXY: CPT

## 2024-01-08 PROCEDURE — 85025 COMPLETE CBC W/AUTO DIFF WBC: CPT

## 2024-03-29 ENCOUNTER — HOSPITAL ENCOUNTER (OUTPATIENT)
Dept: MAMMOGRAPHY | Age: 44
Discharge: HOME | End: 2024-03-29
Payer: COMMERCIAL

## 2024-03-29 DIAGNOSIS — Z12.39 SCREENING BREAST EXAMINATION: ICD-10-CM

## 2024-03-29 PROCEDURE — 77063 BREAST TOMOSYNTHESIS BI: CPT

## 2025-01-17 ENCOUNTER — HOSPITAL ENCOUNTER (EMERGENCY)
Age: 45
Discharge: HOME | End: 2025-01-17
Payer: COMMERCIAL

## 2025-01-17 VITALS
DIASTOLIC BLOOD PRESSURE: 69 MMHG | SYSTOLIC BLOOD PRESSURE: 128 MMHG | OXYGEN SATURATION: 97 % | HEART RATE: 80 BPM | RESPIRATION RATE: 16 BRPM

## 2025-01-17 DIAGNOSIS — B30.9 ACUTE VIRAL CONJUNCTIVITIS OF LEFT EYE: Primary | ICD-10-CM

## 2025-01-17 DIAGNOSIS — J06.9 VIRAL URI: ICD-10-CM

## 2025-01-17 PROCEDURE — 99213 OFFICE O/P EST LOW 20 MIN: CPT

## 2025-01-17 RX ORDER — POLYMYXIN B SULFATE AND TRIMETHOPRIM 1; 10000 MG/ML; [USP'U]/ML
1 SOLUTION OPHTHALMIC EVERY 6 HOURS
Qty: 2 ML | Refills: 0 | Status: SHIPPED | OUTPATIENT
Start: 2025-01-17 | End: 2025-01-27

## 2025-01-17 ASSESSMENT — PAIN - FUNCTIONAL ASSESSMENT: PAIN_FUNCTIONAL_ASSESSMENT: 0-10

## 2025-01-17 ASSESSMENT — PAIN SCALES - GENERAL: PAINLEVEL_OUTOF10: 2

## 2025-01-17 ASSESSMENT — PAIN DESCRIPTION - LOCATION: LOCATION: EYE

## 2025-01-17 ASSESSMENT — PAIN DESCRIPTION - DESCRIPTORS: DESCRIPTORS: TENDER;SORE

## 2025-01-17 ASSESSMENT — ENCOUNTER SYMPTOMS
EYE REDNESS: 1
EYE PAIN: 1
EYE DISCHARGE: 1
RHINORRHEA: 1

## 2025-01-17 ASSESSMENT — PAIN DESCRIPTION - ORIENTATION: ORIENTATION: LEFT

## 2025-01-17 ASSESSMENT — PAIN DESCRIPTION - PAIN TYPE: TYPE: ACUTE PAIN

## 2025-01-17 ASSESSMENT — PAIN DESCRIPTION - FREQUENCY: FREQUENCY: CONTINUOUS

## 2025-01-17 NOTE — DISCHARGE INSTRUCTIONS
Warm compresses  Tylenol and motrin  Pink eye relieve drop over the counter  Wash hands frequently  Avoid touching eye  THIS IS VIRAL but will send over antibiotic eye drops anyhow just in case it gets worse over the weekend  Follow up with PCP

## 2025-01-17 NOTE — DISCHARGE INSTR - COC
Continuity of Care Form    Patient Name: Zayra Dong   :  1980  MRN:  [id number]    Admit date:  2025  Discharge date:  ***    Code Status Order: Prior   Advance Directives:   Advance Care Flowsheet Documentation             Admitting Physician:  No admitting provider for patient encounter.  PCP: Claudia Vasquez APRN - CNP    Discharging Nurse: ***  Discharging Hospital Unit/Room#:   Discharging Unit Phone Number: ***    Emergency Contact:   Extended Emergency Contact Information  Primary Emergency Contact: Consuelo Dong  Address: [address]           [place] [id number] [facility]  Home Phone: [phone]  Mobile Phone: [phone]  Relation: Spouse  Preferred language: English   needed? No    Past Surgical History:  Past Surgical History:   Procedure Laterality Date    CARDIAC CATHETERIZATION  2022    [facility] - Dr. March     SECTION      CHOLECYSTECTOMY      ESOPHAGEAL MOTILITY STUDY N/A 10/13/2022    ESOPHAGEAL MOTILITY/MANOMETRY STUDY performed by Darin Zamora MD at [facility]    UVULECTOMY         Immunization History:   Immunization History   Administered Date(s) Administered    COVID-19, PFIZER PURPLE top, DILUTE for use, (age 12 y+), 30mcg/0.3mL 2021, 2021, 2021    Influenza Virus Vaccine 09/10/2012    TDaP, ADACEL (age 10y-64y), BOOSTRIX (age 10y+), IM, 0.5mL 2006, 2016, 2023       Active Problems:  Patient Active Problem List   Diagnosis Code    Chronic nonseasonal allergic rhinitis due to pollen J30.89    Episodic tension-type headache, not intractable G44.219    Anxiety F41.9    Iron deficiency anemia D50.9    Nasal turbinate hypertrophy J34.3    Snoring R06.83    Menstrual disorder N92.6    Insomnia G47.00    Riggs's esophagus determined by endoscopy K22.70       Isolation/Infection:   Isolation            No Isolation          Patient Infection Status       None to display                that are sent with patient):  {CHP DME Belongings:167393315}    RN SIGNATURE:  {Esignature:803123877}    CASE MANAGEMENT/SOCIAL WORK SECTION    Inpatient Status Date: ***    Readmission Risk Assessment Score:  [facility] RISK OF UNPLANNED READMISSION 2.0             0 Total Score        Discharging to Facility/ Agency   Name:   Address:  Phone:  Fax:    Dialysis Facility (if applicable)   Name:  Address:  Dialysis Schedule:  Phone:  Fax:    / signature: {Esignature:387168305}    PHYSICIAN SECTION    Prognosis: {Prognosis:4544204991}    Condition at Discharge: { Patient Condition:685793922}    Rehab Potential (if transferring to Rehab): {Prognosis:6503768777}    Recommended Labs or Other Treatments After Discharge: ***    Physician Certification: I certify the above information and transfer of Zayra Dong  is necessary for the continuing treatment of the diagnosis listed and that she requires {Admit to Appropriate Level of Care:98849} for {GREATER/LESS:049266757} 30 days.     Update Admission H&P: {CHP DME Changes in HandP:428337984}    PHYSICIAN SIGNATURE:  {Esignature:442888175}

## 2025-01-17 NOTE — ED TRIAGE NOTES
Patient walked to room 6 for left eye watering, yellow drainage onset approx 5:30 pm tonight . No known injury.

## 2025-01-17 NOTE — ED PROVIDER NOTES
[facility] EMERGENCY DEPARTMENT  Urgent Care Encounter       CHIEF COMPLAINT       Chief Complaint   Patient presents with    Eye Drainage     Watering, itching and swollen onset 5:15 pm, left eye        Nurses Notes reviewed and I agree except as noted in the HPI.  HISTORY OF PRESENT ILLNESS   Zayra Dong is a 44 y.o. female who presents with complaints of watery eye redness that started today. Pt reports that earlier in the week she had a viral illness, congestion, runny nose. Pt reports pain 2/10 at this time in left eye.     The history is provided by the patient.       REVIEW OF SYSTEMS     Review of Systems   HENT:  Positive for congestion and rhinorrhea.         Earlier in the week     Eyes:  Positive for pain, discharge and redness.   All other systems reviewed and are negative.      PAST MEDICAL HISTORY         Diagnosis Date    Anxiety     Headache     usually in occipital area.        SURGICALHISTORY     Patient  has a past surgical history that includes  section; Uvulectomy; Cholecystectomy; Cardiac catheterization (2022); and esophageal motility study (N/A, 10/13/2022).    CURRENT MEDICATIONS       Discharge Medication List as of 2025  6:36 PM        CONTINUE these medications which have NOT CHANGED    Details   atorvastatin (LIPITOR) 10 MG tablet Take 1 tablet by mouth nightly, Disp-90 tablet, R-3Normal      dilTIAZem (CARDIZEM CD) 120 MG extended release capsule Take 1 capsule by mouth daily, Disp-90 capsule, R-3Normal      hydrOXYzine HCl (ATARAX) 25 MG tablet Take 1 tablet by mouth nightly as needed for Anxiety, Disp-90 tablet, R-3Normal      metoprolol succinate (TOPROL XL) 25 MG extended release tablet Take 1 tablet by mouth daily, Disp-90 tablet, R-3Normal      sertraline (ZOLOFT) 100 MG tablet Take 1 tablet by mouth daily, Disp-90 tablet, R-3Normal      Vitamin D, Cholecalciferol, 25 MCG (1000 UT) TABS Take 25 mcg by mouth dailyHistorical Med       esomeprazole (NEXIUM) 40 MG delayed release capsule Take 1 capsule by mouth dailyHistorical Med      NONFORMULARY Indications: combo med walfedHistorical Med      famotidine (PEPCID) 40 MG tablet Take 1 tablet by mouth 2 times daily (with meals)Historical Med      ferrous sulfate (IRON 325) 325 (65 Fe) MG tablet Take 1 tablet by mouth 3 times daily (with meals)Historical Med             ALLERGIES     Patient is is allergic to bupropion and imdur [isosorbide nitrate].    Patients   Immunization History   Administered Date(s) Administered    COVID-19, PFIZER PURPLE top, DILUTE for use, (age 12 y+), 30mcg/0.3mL 04/05/2021, 04/23/2021, 11/05/2021    Influenza Virus Vaccine 09/10/2012    TDaP, ADACEL (age 10y-64y), BOOSTRIX (age 10y+), IM, 0.5mL 08/21/2006, 09/29/2016, 11/01/2023       FAMILY HISTORY     Patient's family history includes COPD in her mother; Esophageal Cancer (age of onset: 51) in her father; Heart Disease in her father; Lung Cancer (age of onset: 67) in her mother.    SOCIAL HISTORY     Patient  reports that she has never smoked. She has never been exposed to tobacco smoke. She has never used smokeless tobacco. She reports current alcohol use. She reports that she does not use drugs.    PHYSICAL EXAM     ED TRIAGE VITALS  BP: 128/69,  , Pulse: 80, Respirations: 16, SpO2: 97 %,Estimated body mass index is 30.5 kg/m² as calculated from the following:    Height as of 7/19/24: 1.499 m (4' 11\").    Weight as of 7/19/24: 68.5 kg (151 lb).,Patient's last menstrual period was 01/09/2025.    Physical Exam  Vitals and nursing note reviewed.   Constitutional:       Appearance: She is obese.   HENT:      Right Ear: Tympanic membrane normal.      Left Ear: Tympanic membrane normal.      Nose: Congestion present.      Mouth/Throat:      Mouth: Mucous membranes are moist.      Pharynx: Oropharynx is clear.   Eyes:      General:         Right eye: No discharge.         Left eye: Discharge present.

## 2025-04-28 ENCOUNTER — HOSPITAL ENCOUNTER (OUTPATIENT)
Age: 45
Discharge: HOME | End: 2025-04-28
Payer: COMMERCIAL

## 2025-04-28 DIAGNOSIS — R53.83 FATIGUE, UNSPECIFIED TYPE: ICD-10-CM

## 2025-04-28 DIAGNOSIS — D50.9 IRON DEFICIENCY ANEMIA, UNSPECIFIED IRON DEFICIENCY ANEMIA TYPE: ICD-10-CM

## 2025-04-28 LAB
25(OH)D3 SERPL-MCNC: 23 NG/ML (ref 30–100)
ALBUMIN SERPL BCG-MCNC: 4.3 G/DL (ref 3.4–4.9)
ALP SERPL-CCNC: 69 U/L (ref 38–126)
ALT SERPL W/O P-5'-P-CCNC: 14 U/L (ref 10–35)
ANION GAP SERPL CALC-SCNC: 11 MEQ/L (ref 8–16)
AST SERPL-CCNC: 20 U/L (ref 10–35)
BASOPHILS ABSOLUTE: 0.1 THOU/MM3 (ref 0–0.1)
BASOPHILS NFR BLD AUTO: 1.9 %
BILIRUB CONJ SERPL-MCNC: < 0.1 MG/DL (ref 0–0.2)
BILIRUB SERPL-MCNC: < 0.2 MG/DL (ref 0.3–1.2)
BUN SERPL-MCNC: 10 MG/DL (ref 8–23)
CALCIUM SERPL-MCNC: 9.1 MG/DL (ref 8.6–10)
CHLORIDE SERPL-SCNC: 104 MEQ/L (ref 98–111)
CO2 SERPL-SCNC: 24 MEQ/L (ref 22–29)
CREAT SERPL-MCNC: 1 MG/DL (ref 0.5–0.9)
DEPRECATED RDW RBC AUTO: 49.9 FL (ref 35–45)
EOSINOPHIL NFR BLD AUTO: 3.7 %
EOSINOPHILS ABSOLUTE: 0.2 THOU/MM3 (ref 0–0.4)
ERYTHROCYTE [DISTWIDTH] IN BLOOD BY AUTOMATED COUNT: 14.8 % (ref 11.5–14.5)
FERRITIN SERPL IA-MCNC: 64 NG/ML (ref 13–150)
GFR SERPL CREATININE-BSD FRML MDRD: 71 ML/MIN/1.73M2
GLUCOSE SERPL-MCNC: 95 MG/DL (ref 74–109)
HCT VFR BLD AUTO: 40.4 % (ref 37–47)
HGB BLD-MCNC: 13.3 GM/DL (ref 12–16)
HGB RETIC QN AUTO: 34.8 PG (ref 28.2–35.7)
IMM GRANULOCYTES # BLD AUTO: 0.02 THOU/MM3 (ref 0–0.07)
IMM GRANULOCYTES NFR BLD AUTO: 0.3 %
IMM RETICS NFR: 7.2 % (ref 3–15.9)
IRON SATN MFR SERPL: 16 % (ref 20–50)
IRON SERPL-MCNC: 45 UG/DL (ref 37–145)
LYMPHOCYTES ABSOLUTE: 1.7 THOU/MM3 (ref 1–4.8)
LYMPHOCYTES NFR BLD AUTO: 29.6 %
MCH RBC QN AUTO: 30.2 PG (ref 26–33)
MCHC RBC AUTO-ENTMCNC: 32.9 GM/DL (ref 32.2–35.5)
MCV RBC AUTO: 91.6 FL (ref 81–99)
MONOCYTES ABSOLUTE: 0.5 THOU/MM3 (ref 0.4–1.3)
MONOCYTES NFR BLD AUTO: 9.1 %
NEUTROPHILS ABSOLUTE: 3.3 THOU/MM3 (ref 1.8–7.7)
NEUTROPHILS NFR BLD AUTO: 55.4 %
NRBC BLD AUTO-RTO: 0 /100 WBC
PLATELET # BLD AUTO: 189 THOU/MM3 (ref 130–400)
PMV BLD AUTO: 11.3 FL (ref 9.4–12.4)
POTASSIUM SERPL-SCNC: 4.3 MEQ/L (ref 3.5–5.2)
PROT SERPL-MCNC: 6.9 G/DL (ref 6.4–8.3)
RBC # BLD AUTO: 4.41 MILL/MM3 (ref 4.2–5.4)
RETICS # AUTO: 45 THOU/MM3 (ref 20–115)
RETICS/RBC NFR AUTO: 1 % (ref 0.5–2)
SODIUM SERPL-SCNC: 139 MEQ/L (ref 135–145)
TIBC SERPL-MCNC: 286 UG/DL (ref 171–450)
TSH SERPL DL<=0.05 MIU/L-ACNC: 2.66 UIU/ML (ref 0.27–4.2)
WBC # BLD AUTO: 5.9 THOU/MM3 (ref 4.8–10.8)

## 2025-04-28 PROCEDURE — 36415 COLL VENOUS BLD VENIPUNCTURE: CPT

## 2025-04-28 PROCEDURE — 85046 RETICYTE/HGB CONCENTRATE: CPT

## 2025-04-28 PROCEDURE — 82248 BILIRUBIN DIRECT: CPT

## 2025-04-28 PROCEDURE — 85025 COMPLETE CBC W/AUTO DIFF WBC: CPT

## 2025-04-28 PROCEDURE — 82306 VITAMIN D 25 HYDROXY: CPT

## 2025-04-28 PROCEDURE — 82728 ASSAY OF FERRITIN: CPT

## 2025-04-28 PROCEDURE — 83540 ASSAY OF IRON: CPT

## 2025-04-28 PROCEDURE — 83550 IRON BINDING TEST: CPT

## 2025-04-28 PROCEDURE — 84443 ASSAY THYROID STIM HORMONE: CPT

## 2025-04-28 PROCEDURE — 80053 COMPREHEN METABOLIC PANEL: CPT

## 2025-04-30 ENCOUNTER — HOSPITAL ENCOUNTER (OUTPATIENT)
Dept: MAMMOGRAPHY | Age: 45
Discharge: HOME | End: 2025-04-30
Payer: COMMERCIAL

## 2025-04-30 DIAGNOSIS — Z12.39 SCREENING BREAST EXAMINATION: ICD-10-CM

## 2025-04-30 PROCEDURE — 77063 BREAST TOMOSYNTHESIS BI: CPT

## 2025-05-07 ENCOUNTER — HOSPITAL ENCOUNTER (OUTPATIENT)
Dept: WOMENS IMAGING | Age: 45
Discharge: HOME | End: 2025-05-07
Attending: RADIOLOGY
Payer: COMMERCIAL

## 2025-05-07 DIAGNOSIS — R92.8 ABNORMAL MAMMOGRAM: ICD-10-CM

## 2025-05-07 PROCEDURE — G0279 TOMOSYNTHESIS, MAMMO: HCPCS

## 2025-05-07 PROCEDURE — 76642 ULTRASOUND BREAST LIMITED: CPT
